# Patient Record
Sex: FEMALE | Race: WHITE | NOT HISPANIC OR LATINO
[De-identification: names, ages, dates, MRNs, and addresses within clinical notes are randomized per-mention and may not be internally consistent; named-entity substitution may affect disease eponyms.]

---

## 2017-01-04 ENCOUNTER — IMAGING SERVICES (OUTPATIENT)
Dept: OTHER | Age: 47
End: 2017-01-04

## 2017-01-04 ENCOUNTER — HOSPITAL (OUTPATIENT)
Dept: OTHER | Age: 47
End: 2017-01-04
Attending: SURGERY

## 2017-07-12 ENCOUNTER — IMAGING SERVICES (OUTPATIENT)
Dept: OTHER | Age: 47
End: 2017-07-12

## 2017-07-12 ENCOUNTER — HOSPITAL (OUTPATIENT)
Dept: OTHER | Age: 47
End: 2017-07-12
Attending: SURGERY

## 2017-07-17 ENCOUNTER — CHARTING TRANS (OUTPATIENT)
Dept: OTHER | Age: 47
End: 2017-07-17

## 2017-07-20 ENCOUNTER — CHARTING TRANS (OUTPATIENT)
Dept: OTHER | Age: 47
End: 2017-07-20

## 2017-08-04 ENCOUNTER — HOSPITAL (OUTPATIENT)
Dept: OTHER | Age: 47
End: 2017-08-04
Attending: SURGERY

## 2017-08-04 ENCOUNTER — IMAGING SERVICES (OUTPATIENT)
Dept: OTHER | Age: 47
End: 2017-08-04

## 2017-08-08 ENCOUNTER — CHARTING TRANS (OUTPATIENT)
Dept: OTHER | Age: 47
End: 2017-08-08

## 2017-08-10 ENCOUNTER — CHARTING TRANS (OUTPATIENT)
Dept: OTHER | Age: 47
End: 2017-08-10

## 2017-09-12 ENCOUNTER — HOSPITAL (OUTPATIENT)
Dept: OTHER | Age: 47
End: 2017-09-12
Attending: SURGERY

## 2017-09-12 ENCOUNTER — IMAGING SERVICES (OUTPATIENT)
Dept: OTHER | Age: 47
End: 2017-09-12

## 2017-09-14 ENCOUNTER — CHARTING TRANS (OUTPATIENT)
Dept: OTHER | Age: 47
End: 2017-09-14

## 2017-11-16 PROCEDURE — 81001 URINALYSIS AUTO W/SCOPE: CPT | Performed by: INTERNAL MEDICINE

## 2018-03-15 ENCOUNTER — IMAGING SERVICES (OUTPATIENT)
Dept: OTHER | Age: 48
End: 2018-03-15

## 2018-03-15 ENCOUNTER — HOSPITAL (OUTPATIENT)
Dept: OTHER | Age: 48
End: 2018-03-15
Attending: SURGERY

## 2018-03-21 ENCOUNTER — CHARTING TRANS (OUTPATIENT)
Dept: OTHER | Age: 48
End: 2018-03-21

## 2019-03-04 PROBLEM — Z80.3 FAMILY HISTORY OF BREAST CANCER: Status: ACTIVE | Noted: 2019-03-04

## 2019-03-04 PROBLEM — Z12.31 ENCOUNTER FOR SCREENING MAMMOGRAM FOR HIGH-RISK PATIENT: Status: ACTIVE | Noted: 2019-03-04

## 2019-03-04 PROBLEM — Z13.71 BRCA NEGATIVE: Status: ACTIVE | Noted: 2019-03-04

## 2019-03-04 PROBLEM — Z91.89 INCREASED RISK OF BREAST CANCER: Status: ACTIVE | Noted: 2019-03-04

## 2019-03-06 ENCOUNTER — OFFICE VISIT (OUTPATIENT)
Dept: SURGERY | Age: 49
End: 2019-03-06

## 2019-03-06 VITALS
BODY MASS INDEX: 22.22 KG/M2 | SYSTOLIC BLOOD PRESSURE: 96 MMHG | WEIGHT: 150 LBS | HEIGHT: 69 IN | DIASTOLIC BLOOD PRESSURE: 59 MMHG

## 2019-03-06 DIAGNOSIS — Z91.89 INCREASED RISK OF BREAST CANCER: Primary | ICD-10-CM

## 2019-03-06 DIAGNOSIS — Z13.71 BRCA NEGATIVE: ICD-10-CM

## 2019-03-06 DIAGNOSIS — Z80.3 FAMILY HISTORY OF BREAST CANCER: ICD-10-CM

## 2019-03-06 DIAGNOSIS — Z12.31 ENCOUNTER FOR SCREENING MAMMOGRAM FOR HIGH-RISK PATIENT: ICD-10-CM

## 2019-03-06 PROCEDURE — 99214 OFFICE O/P EST MOD 30 MIN: CPT | Performed by: SURGERY

## 2019-03-06 RX ORDER — ESCITALOPRAM OXALATE 10 MG/1
10 TABLET ORAL
COMMUNITY
Start: 2019-01-27 | End: 2020-07-22

## 2019-03-06 RX ORDER — LANOLIN ALCOHOL/MO/W.PET/CERES
1000 CREAM (GRAM) TOPICAL
COMMUNITY

## 2019-03-26 ENCOUNTER — HOSPITAL (OUTPATIENT)
Dept: OTHER | Age: 49
End: 2019-03-26
Attending: SURGERY

## 2019-04-04 ENCOUNTER — TRANSFERRED RECORDS (OUTPATIENT)
Dept: HEALTH INFORMATION MANAGEMENT | Facility: CLINIC | Age: 49
End: 2019-04-04

## 2019-04-04 PROCEDURE — 88305 TISSUE EXAM BY PATHOLOGIST: CPT | Performed by: INTERNAL MEDICINE

## 2019-08-07 ENCOUNTER — TELEPHONE (OUTPATIENT)
Dept: SURGERY | Age: 49
End: 2019-08-07

## 2019-09-11 ENCOUNTER — HOSPITAL (OUTPATIENT)
Dept: OTHER | Age: 49
End: 2019-09-11
Attending: SURGERY

## 2019-10-17 ENCOUNTER — TRANSFERRED RECORDS (OUTPATIENT)
Dept: HEALTH INFORMATION MANAGEMENT | Facility: CLINIC | Age: 49
End: 2019-10-17

## 2020-03-04 ENCOUNTER — APPOINTMENT (OUTPATIENT)
Dept: SURGERY | Age: 50
End: 2020-03-04

## 2020-03-05 ENCOUNTER — APPOINTMENT (OUTPATIENT)
Dept: SURGERY | Age: 50
End: 2020-03-05

## 2020-04-01 ENCOUNTER — HOSPITAL (OUTPATIENT)
Dept: OTHER | Age: 50
End: 2020-04-01

## 2020-05-01 ENCOUNTER — HOSPITAL (OUTPATIENT)
Dept: OTHER | Age: 50
End: 2020-05-01

## 2020-06-01 ENCOUNTER — HOSPITAL (OUTPATIENT)
Dept: OTHER | Age: 50
End: 2020-06-01

## 2020-07-01 ENCOUNTER — HOSPITAL (OUTPATIENT)
Dept: OTHER | Age: 50
End: 2020-07-01
Attending: SURGERY

## 2020-07-22 ENCOUNTER — OFFICE VISIT (OUTPATIENT)
Dept: SURGERY | Age: 50
End: 2020-07-22

## 2020-07-22 VITALS
WEIGHT: 150 LBS | SYSTOLIC BLOOD PRESSURE: 112 MMHG | HEIGHT: 70 IN | RESPIRATION RATE: 16 BRPM | DIASTOLIC BLOOD PRESSURE: 76 MMHG | HEART RATE: 58 BPM | BODY MASS INDEX: 21.47 KG/M2

## 2020-07-22 DIAGNOSIS — Z12.31 ENCOUNTER FOR SCREENING MAMMOGRAM FOR HIGH-RISK PATIENT: ICD-10-CM

## 2020-07-22 DIAGNOSIS — Z91.89 INCREASED RISK OF BREAST CANCER: Primary | ICD-10-CM

## 2020-07-22 PROCEDURE — 99213 OFFICE O/P EST LOW 20 MIN: CPT | Performed by: SURGERY

## 2020-08-12 ENCOUNTER — TRANSFERRED RECORDS (OUTPATIENT)
Dept: HEALTH INFORMATION MANAGEMENT | Facility: CLINIC | Age: 50
End: 2020-08-12

## 2020-08-21 ENCOUNTER — APPOINTMENT (OUTPATIENT)
Dept: MAMMOGRAPHY | Age: 50
End: 2020-08-21
Attending: SURGERY

## 2020-08-25 ENCOUNTER — HOSPITAL ENCOUNTER (OUTPATIENT)
Dept: MAMMOGRAPHY | Age: 50
Discharge: HOME | End: 2020-08-25
Attending: SURGERY

## 2020-08-25 DIAGNOSIS — Z12.31 ENCOUNTER FOR SCREENING MAMMOGRAM FOR HIGH-RISK PATIENT: ICD-10-CM

## 2020-08-25 PROCEDURE — 77063 BREAST TOMOSYNTHESIS BI: CPT

## 2020-10-12 ENCOUNTER — TELEPHONE (OUTPATIENT)
Dept: INFUSION THERAPY | Age: 50
End: 2020-10-12

## 2021-01-01 ENCOUNTER — EXTERNAL RECORD (OUTPATIENT)
Dept: HEALTH INFORMATION MANAGEMENT | Facility: OTHER | Age: 51
End: 2021-01-01

## 2021-02-16 ENCOUNTER — TRANSFERRED RECORDS (OUTPATIENT)
Dept: HEALTH INFORMATION MANAGEMENT | Facility: CLINIC | Age: 51
End: 2021-02-16

## 2021-04-15 ENCOUNTER — TELEPHONE (OUTPATIENT)
Dept: SURGERY | Age: 51
End: 2021-04-15

## 2021-05-26 ENCOUNTER — TELEPHONE (OUTPATIENT)
Dept: SURGERY | Age: 51
End: 2021-05-26

## 2021-07-06 ENCOUNTER — OFFICE VISIT (OUTPATIENT)
Dept: INTERNAL MEDICINE | Facility: CLINIC | Age: 51
End: 2021-07-06
Payer: COMMERCIAL

## 2021-07-06 VITALS
HEART RATE: 66 BPM | OXYGEN SATURATION: 100 % | HEIGHT: 70 IN | WEIGHT: 146.13 LBS | BODY MASS INDEX: 20.92 KG/M2 | DIASTOLIC BLOOD PRESSURE: 72 MMHG | SYSTOLIC BLOOD PRESSURE: 108 MMHG

## 2021-07-06 DIAGNOSIS — Z00.00 HEALTHCARE MAINTENANCE: ICD-10-CM

## 2021-07-06 DIAGNOSIS — H61.22 IMPACTED CERUMEN OF LEFT EAR: ICD-10-CM

## 2021-07-06 DIAGNOSIS — Z80.3 FAMILY HISTORY OF MALIGNANT NEOPLASM OF BREAST: ICD-10-CM

## 2021-07-06 DIAGNOSIS — K43.9 VENTRAL HERNIA WITHOUT OBSTRUCTION OR GANGRENE: ICD-10-CM

## 2021-07-06 DIAGNOSIS — D22.9 MULTIPLE BENIGN NEVI: ICD-10-CM

## 2021-07-06 DIAGNOSIS — Z00.00 HEALTHCARE MAINTENANCE: Primary | ICD-10-CM

## 2021-07-06 LAB
FSH SERPL-ACNC: 111.7 IU/L
HCV AB SERPL QL IA: NONREACTIVE

## 2021-07-06 PROCEDURE — 86803 HEPATITIS C AB TEST: CPT | Mod: 90 | Performed by: PATHOLOGY

## 2021-07-06 PROCEDURE — 36415 COLL VENOUS BLD VENIPUNCTURE: CPT | Performed by: PATHOLOGY

## 2021-07-06 PROCEDURE — 83001 ASSAY OF GONADOTROPIN (FSH): CPT | Mod: 90 | Performed by: PATHOLOGY

## 2021-07-06 PROCEDURE — 99386 PREV VISIT NEW AGE 40-64: CPT | Mod: GC

## 2021-07-06 PROCEDURE — 99000 SPECIMEN HANDLING OFFICE-LAB: CPT | Performed by: PATHOLOGY

## 2021-07-06 RX ORDER — LORAZEPAM 0.5 MG/1
0.5 TABLET ORAL EVERY 6 HOURS PRN
COMMUNITY
End: 2022-12-21

## 2021-07-06 RX ORDER — UBIDECARENONE 75 MG
100 CAPSULE ORAL DAILY
COMMUNITY

## 2021-07-06 RX ORDER — VITAMIN B COMPLEX
TABLET ORAL DAILY
COMMUNITY

## 2021-07-06 RX ORDER — FERROUS SULFATE 325(65) MG
325 TABLET ORAL
COMMUNITY

## 2021-07-06 RX ORDER — CHLORAL HYDRATE 500 MG
2 CAPSULE ORAL DAILY
COMMUNITY

## 2021-07-06 ASSESSMENT — PATIENT HEALTH QUESTIONNAIRE - PHQ9
5. POOR APPETITE OR OVEREATING: NOT AT ALL
SUM OF ALL RESPONSES TO PHQ QUESTIONS 1-9: 2

## 2021-07-06 ASSESSMENT — ANXIETY QUESTIONNAIRES
5. BEING SO RESTLESS THAT IT IS HARD TO SIT STILL: NOT AT ALL
2. NOT BEING ABLE TO STOP OR CONTROL WORRYING: NOT AT ALL
GAD7 TOTAL SCORE: 1
7. FEELING AFRAID AS IF SOMETHING AWFUL MIGHT HAPPEN: NOT AT ALL
6. BECOMING EASILY ANNOYED OR IRRITABLE: SEVERAL DAYS
IF YOU CHECKED OFF ANY PROBLEMS ON THIS QUESTIONNAIRE, HOW DIFFICULT HAVE THESE PROBLEMS MADE IT FOR YOU TO DO YOUR WORK, TAKE CARE OF THINGS AT HOME, OR GET ALONG WITH OTHER PEOPLE: NOT DIFFICULT AT ALL
1. FEELING NERVOUS, ANXIOUS, OR ON EDGE: NOT AT ALL
3. WORRYING TOO MUCH ABOUT DIFFERENT THINGS: NOT AT ALL

## 2021-07-06 ASSESSMENT — MIFFLIN-ST. JEOR: SCORE: 1345.13

## 2021-07-06 NOTE — NURSING NOTE
Chief Complaint   Patient presents with     [facility]     est care - hernia        Karly Rocha MA,  at 8:08 AM on 7/6/2021.

## 2021-07-06 NOTE — PROGRESS NOTES
PRIMARY CARE CENTER         HPI:      HPI:  Gemma Garvey is a 50 year old female who presents to the clinic to establish care with a new PCP. Pt states she recently moved to [place] from [place].        She would like to have her hernia evaluated today. Pt states she has 2 hernias which will bulge slightly when she bears down. She has had them for many years. They are not painful. Pt had surgery on the hernia n 10/2013.        Pt is also concerned about her L ear. She states she has a feeling of fullness, but no pain. The Right ear is unaffected.        Pt would also like to discuss breast cancer screening as she has a significant fhx of breast ca in her 2 sisters (out of 5 sisters).       She recently had Mirena removed. Pt stopped having a period, but did not notice she was going through menopause. Denies Hot flashes. She would like to know if she needs to use protection during sex.       PMH: -Anxiety (stopped anti-anxiety meds in aaprox 2019           -Hernia           -First period- 22;  Had 1st child at 29           -Due to pt's fhx of breast CA   Breast MRI in April and Mammogram August 2020 (both unremarkable)           -Last pap smear 9/9/2020           -Colonoscopy at age 48    Surgical hx- Hernia repair in 10/2013    Social hx- recently moved to [place] from [place] 2 weeks ago  Non-smoker; ETOH 2-3 drinks/per week (wine or beer)  -Has 2 children (Son is students at the  studying mnagement information systems); Daughter ar [facility] ([facility] studying mechanical [employer])  Mother in assisted living in [place] and 2 sisters live in [place]  Occupation:   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 All siblings BRCA negative        Medications:  Current Outpatient Medications   Medication     cyanocobalamin (VITAMIN B-12) 100 MCG tablet     ferrous sulfate (FEROSUL) 325 (65 Fe) MG  "tablet     fish oil-omega-3 fatty acids 1000 MG capsule     Vitamin D3 (CHOLECALCIFEROL) 25 mcg (1000 units) tablet     LORazepam (ATIVAN) 0.5 MG tablet     No current facility-administered medications for this visit.         Allergies:     Allergies   Allergen Reactions     Cats      No Known Drug Allergies          Medical History:  Past Medical History:   Diagnosis Date     Anemia, unspecified '93    Anemia- now resolved     Depressive disorder, not elsewhere classified '98    Depression (non-psychotic)-resolved with Paxil     MEDICAL HISTORY OF -     uterine prolapse           Problem, Medication and Allergy Lists were reviewed and are current.  Patient is a new patient to this clinic and so  I reviewed/updated the Past Medical History, the Family History and the Social History.          Review of Systems:     General: No fevers or chills  Skin: No rash or diaphoresis  Eyes: No eye redness or discharge  Ears/Nose/Throat: No rhinorrhea or nasal congestion  Respiratory: No cough or SOB  Cardiovascular: No chest pain or palpitations  Gastrointestinal: No nausea, vomiting, or diarrhea  Genitourinary: No urinary frequency, hematuria, or dysuria  Musculoskeletal: No arthralgias or myalgias  Neurologic: No numbness or weakness  Psychiatric: No depression or SI  Hematologic/Lymphatic/Immunologic: No leg swelling, no easy bruising/bleeding  Endocrine: No polyuria/polydypsia      ROS  I have personally reviewed and updated the complete ROS on the day of the visit.           Physical Exam:   /72 (BP Location: Right arm, Patient Position: Sitting, Cuff Size: Adult Regular)   Pulse 66   Ht 1.765 m (5' 9.5\")   Wt 66.3 kg (146 lb 2 oz)   SpO2 100%   BMI 21.27 kg/m    Body mass index is 21.27 kg/m .  Vitals were reviewed    Physical Exam:   General: Sitting upright, talking in complete sentences, non-distressed, pleasant and appears stated age  HEENT: Normocephalic, atraumatic, PERRL, EOMI, no conjunctival pallor, " anicteric, nares patent, oropharynx clear and moist, +Cerumen in L ear obstructing view of TM  CVS: S1/S2 WNL, RRR, no murmur, no LE edema, cap refill <2 seconds  Pulm: Non-labored breathing, vesicular breath sounds, no crackles or wheeze  Abdo: Non-distended, non-tender to palpation, no rebound or guarding, BS present; 2 ventral hernias (one in epigastric region and other slightly superior to umbilicus)  MSK: No obvious bony deformities, no clubbing  Skin: Warm and dry, no obvious rashes; Nevi on extremities and trunk  Neuro: Alert and oriented x3, non-focal   Psych: Normal mood and affect       Results:      Results from the last 24 hoursNo results found for this or any previous visit (from the past 24 hour(s)).  Assessment and Plan     Gemma was seen today for establish care.    Diagnoses and all orders for this visit:    Multiple benign nevi  -     ADULT DERMATOLOGY REFERRAL    Ventral hernia without obstruction or gangrene  -     GENERAL SURG ADULT REFERRAL    Family history of malignant neoplasm of breast  -     BREAST PROVIDER REFERRAL    Healthcare maintenance  -     Comprehensive metabolic panel; Future  -     CBC with platelets differential; Future  -     Follicle stimulating hormone; Future  -     Lipid panel reflex to direct LDL Fasting; Future  -     HCV Screen with Reflex; Future    Impacted cerumen of left ear    #Healthcare Maintenance  -Pt presented to establish care with PCP  -No significant medical problems  -Pt is fully vaccinated against Covid  -Pt had colonoscopy at age 48 and Pap smear on 9/9/21  -Pt will establish care with a GYN to have routine pelvic examinations  -She recently had Mirena removed. I recommended pt use condoms for now. Will check FSH.   -Check CBC, CMP, Lipid panel, and HCV  -F/u for routine exam in 1 year    #Multiple Benign Nevi  -Pt is fair skinned with red hair  -She does not have routine skin checks  -Will have pt f/u with Derm for annual skin checks    #Ventral hernia  w/o obstruction or gangrene  -2 ventral hernias; pt had surgery for hernias in 2013, but they have recurred  -Pt denies pain; Discussed symptoms for pt to monitor which would indicate obstruction or incarceration.   -Will refer to gen surg for further eval and management    #Fhx of breast ca  -Pt is BRCA negative  -Pt's 2 sisters had breast ca, they are s/p lumpectomy and tamoxifen therapy  -Pt was followed by breast specialist in [place]; had q 6 month mammogram alternating with MRI  -Tricia score 2.8%  -Will refer pt to breast center for preventive care and high risk breast screening    #Cerumen in L ear  -Recommend Debrox use to soften cerumen    Options for treatment and follow-up care were reviewed with the patient. Gemma Garvey engaged in the decision making process and verbalized understanding of the options discussed and agreed with the final plan.    Yvrose Garcia MD  Jul 6, 2021    Pt was seen and plan of care discussed with Dr. Roca.     Yvrose Garcia MD  Internal Medicine    PGY-1

## 2021-07-06 NOTE — PATIENT INSTRUCTIONS
Gemma,    Thank you for establishing care with me today. You will be referred to specialists in Dermatology, General Surgery, and the Breast Center. You may also establish care with an OB/GYN. I will follow up with you for your routing annual exam in one year. Please do not hesitate to contact me sooner if you have any questions or concerns.   Best,    Dr. Garcia

## 2021-07-07 ASSESSMENT — ANXIETY QUESTIONNAIRES: GAD7 TOTAL SCORE: 1

## 2021-07-13 ENCOUNTER — TELEPHONE (OUTPATIENT)
Dept: INTERNAL MEDICINE | Facility: CLINIC | Age: 51
End: 2021-07-13

## 2021-07-13 NOTE — TELEPHONE ENCOUNTER
I called Gemma Vincent this morning (7/13/21) at 8:28AM. I shared with her that her HCV test was nonreactive and also that her FSH level was 111 and within the postmenopausal range. I informed her that she no longer needed to use protection during intercourse. Pt should f/u if she has any other questions or concerns and I am happy to see her at any time. If no concerns arise, I will see her June 2022 for her next annual visit.     Yvrose Garcia MD  Internal Medicine  PGY1

## 2021-07-15 ENCOUNTER — PATIENT OUTREACH (OUTPATIENT)
Dept: SURGERY | Facility: CLINIC | Age: 51
End: 2021-07-15

## 2021-07-15 NOTE — PROGRESS NOTES
Called patient to schedule an appointment for a hernia consult based on referral sent to General Surgery on 7/6. Based on clinic visit, patient has two ventral hernias, no imaging. Left voicemail on patient's cell phone to return call to General Surgery Clinic if she is wanting to schedule an appointment.

## 2021-07-24 ENCOUNTER — HEALTH MAINTENANCE LETTER (OUTPATIENT)
Age: 51
End: 2021-07-24

## 2021-07-29 ENCOUNTER — APPOINTMENT (OUTPATIENT)
Dept: SURGERY | Age: 51
End: 2021-07-29

## 2021-08-23 ENCOUNTER — TELEPHONE (OUTPATIENT)
Facility: CLINIC | Age: 51
End: 2021-08-23

## 2021-08-23 NOTE — TELEPHONE ENCOUNTER
[facility] Call Center    Phone Message    May a detailed message be left on voicemail: yes     Reason for Call: Appointment Intake    Referring Provider Name: Yvrose Garcia  Diagnosis and/or Symptoms: Ventral Hernia    Action Taken: Message routed to:  [facility] Gen Surg    Travel Screening: Not Applicable

## 2021-08-26 ENCOUNTER — LAB (OUTPATIENT)
Dept: LAB | Facility: CLINIC | Age: 51
End: 2021-08-26
Payer: COMMERCIAL

## 2021-08-26 DIAGNOSIS — Z00.00 HEALTHCARE MAINTENANCE: ICD-10-CM

## 2021-08-26 LAB
ALBUMIN SERPL-MCNC: 4.2 G/DL (ref 3.4–5)
ALP SERPL-CCNC: 68 U/L (ref 40–150)
ALT SERPL W P-5'-P-CCNC: 29 U/L (ref 0–50)
ANION GAP SERPL CALCULATED.3IONS-SCNC: 1 MMOL/L (ref 3–14)
AST SERPL W P-5'-P-CCNC: 24 U/L (ref 0–45)
BILIRUB SERPL-MCNC: 0.4 MG/DL (ref 0.2–1.3)
BUN SERPL-MCNC: 13 MG/DL (ref 7–30)
CALCIUM SERPL-MCNC: 9.3 MG/DL (ref 8.5–10.1)
CHLORIDE BLD-SCNC: 107 MMOL/L (ref 94–109)
CHOLEST SERPL-MCNC: 199 MG/DL
CO2 SERPL-SCNC: 30 MMOL/L (ref 20–32)
CREAT SERPL-MCNC: 0.81 MG/DL (ref 0.52–1.04)
ERYTHROCYTE [DISTWIDTH] IN BLOOD BY AUTOMATED COUNT: 13.1 % (ref 10–15)
FASTING STATUS PATIENT QL REPORTED: YES
GFR SERPL CREATININE-BSD FRML MDRD: 84 ML/MIN/1.73M2
GLUCOSE BLD-MCNC: 93 MG/DL (ref 70–99)
HCT VFR BLD AUTO: 39.5 % (ref 35–47)
HDLC SERPL-MCNC: 102 MG/DL
HGB BLD-MCNC: 12.6 G/DL (ref 11.7–15.7)
LDLC SERPL CALC-MCNC: 87 MG/DL
MCH RBC QN AUTO: 31.2 PG (ref 26.5–33)
MCHC RBC AUTO-ENTMCNC: 31.9 G/DL (ref 31.5–36.5)
MCV RBC AUTO: 98 FL (ref 78–100)
NONHDLC SERPL-MCNC: 97 MG/DL
PLATELET # BLD AUTO: 167 10E3/UL (ref 150–450)
POTASSIUM BLD-SCNC: 4.8 MMOL/L (ref 3.4–5.3)
PROT SERPL-MCNC: 6.8 G/DL (ref 6.8–8.8)
RBC # BLD AUTO: 4.04 10E6/UL (ref 3.8–5.2)
SODIUM SERPL-SCNC: 138 MMOL/L (ref 133–144)
TRIGL SERPL-MCNC: 51 MG/DL
WBC # BLD AUTO: 2.6 10E3/UL (ref 4–11)

## 2021-08-26 PROCEDURE — 80053 COMPREHEN METABOLIC PANEL: CPT | Performed by: PATHOLOGY

## 2021-08-26 PROCEDURE — 85027 COMPLETE CBC AUTOMATED: CPT | Performed by: PATHOLOGY

## 2021-08-26 PROCEDURE — 36415 COLL VENOUS BLD VENIPUNCTURE: CPT | Performed by: PATHOLOGY

## 2021-08-26 PROCEDURE — 80061 LIPID PANEL: CPT | Performed by: PATHOLOGY

## 2021-09-13 NOTE — TELEPHONE ENCOUNTER
Called patient and got her scheduled with Dr. Almendarez, a partner of Dr. Doan, on 9/24/21 at 12:00pm. Patient aware and agreeable to this appointment.

## 2021-09-13 NOTE — TELEPHONE ENCOUNTER
[facility] Call Center    Phone Message    May a detailed message be left on voicemail: yes     Reason for Call: Other: Gemma calling back. She states no one has called her to schedule apt with Dr. Doan. See message below. Please call her back as soon as possible to schedule.      Action Taken: Message routed to:  [facility]): uc surg    Travel Screening: Not Applicable

## 2021-09-14 NOTE — TELEPHONE ENCOUNTER
REFERRAL INFORMATION:    Referring Provider:  Dr. Yvrose Garcia     Referring Clinic:  [facility] Internal Medicine     Reason for Visit/Diagnosis: Ventral Hernia        FUTURE VISIT INFORMATION:    Appointment Date: 9/24/2021    Appointment Time: 12 PM      NOTES RECORD STATUS  DETAILS   OFFICE NOTE from Referring Provider Internal 7/6/2021 Office visit with Dr. Garcia     OFFICE NOTE from Other Specialists N/A    HOSPITAL DISCHARGE SUMMARY/ ED VISITS  N/A    OPERATIVE REPORT N/A    ENDOSCOPY (EGD)  N/A    PERTINENT LABS Internal    PATHOLOGY REPORTS (RELATED) N/A    IMAGING (CT, MRI, US, XR)  N/A

## 2021-09-16 ENCOUNTER — TELEPHONE (OUTPATIENT)
Dept: INTERNAL MEDICINE | Facility: CLINIC | Age: 51
End: 2021-09-16

## 2021-09-16 NOTE — TELEPHONE ENCOUNTER
[facility] Call Center    Phone Message    May a detailed message be left on voicemail: yes     Reason for Call: Other: Patient calling to request her NICANOR that was scanned on 8/24/21 be faxed to [facility] to request her records. Patient states she spoke with medical records and they informed her that it should of been faxed by her PCP for her request. Please call to discuss thank you.      Action Taken: Message routed to:  [facility] pcc    Travel Screening: Not Applicable

## 2021-09-17 NOTE — CONFIDENTIAL NOTE
Left a message with [employer] to return my call.  I also re faxed the NICANOR again to them.    Sindi Mayer on 9/17/2021 at 11:23 AM

## 2021-09-18 ENCOUNTER — HEALTH MAINTENANCE LETTER (OUTPATIENT)
Age: 51
End: 2021-09-18

## 2021-09-21 NOTE — PROGRESS NOTES
[facility] Dermatology Note  Encounter Date: Sep 22, 2021  Office Visit     Dermatology Problem List:  #. FBSE 9/22/2021  ____________________________________________    Assessment & Plan:    # Multiple benign nevi.   - No concerning lesions today  - Counseled on ABCDEs of melanoma and sun protection - recommend SPF 30 or higher with frequent application   - Return sooner if noticing changing or symptomatic lesions    #. Benign lesions: Solar lentigenes, cherry angiomas. Benign nature discussed. No treatment is necessary at this time unless the lesion changes or becomes symptomatic.       Procedures Performed:   None    Follow-up: 1-2 year(s) in-person, or earlier for new or changing lesions    Staff and Scribe:     Provider Disclosure:   The documentation recorded by the scribe accurately reflects the services I personally performed and the decisions made by me.    Adela Schmidt MD    Department of Dermatology  [facility]: Phone: [phone], Fax: [phone]  9/30/2021       Scribe Disclosure:  I, Shania Cotto, am serving as a scribe to document services personally performed by Adela Schmidt MD based on data collection and the provider's statements to me.   ____________________________________________    CC: Skin Check (alberto is coming in today for a skin check, states that she has no spots of concern)      HPI:  Ms. Alberto Garvey is a(n) 50 year old female who presents today as a new patient for skin check.    Patient was referred by Dr. Garcia for a skin check.    The patient otherwise denies any new or concerning lesions. No bleeding, painful, pruritic, or changing lesions. They report no personal history of skin cancer. There is no family history of skin cancer. No history of indoor tanning. Although, she does endorse a significant history of blistering sunburn. She does wear sunscreen  on her face, but not to other spots on her body. She does have fair skin, red hair, and burns easily. Health otherwise stable. No other skin concerns.     SH: Patient recently moved to [place] in June.     Patient is otherwise feeling well, without additional skin concerns.    Labs Reviewed:  N/A    Physical Exam:  Vitals: There were no vitals taken for this visit.  SKIN: Full body skin exam excluding the genitals was performed including face, scalp, neck, ears, chest, back, bilateral arms, hands, bilateral legs, feet, and buttocks.   - There are waxy stuck on tan to brown papules on the right shoulder.   - Multiple regular brown pigmented macules and papules are identified on the trunk and extremities.    - There are dome shaped bright red papules on the trunk and extremities.   - Scattered brown macules on sun exposed areas.  - No other lesions of concern on areas examined.     Medications:  Current Outpatient Medications   Medication     cyanocobalamin (VITAMIN B-12) 100 MCG tablet     ferrous sulfate (FEROSUL) 325 (65 Fe) MG tablet     fish oil-omega-3 fatty acids 1000 MG capsule     LORazepam (ATIVAN) 0.5 MG tablet     Vitamin D3 (CHOLECALCIFEROL) 25 mcg (1000 units) tablet     No current facility-administered medications for this visit.        Past Medical History:   Patient Active Problem List   Diagnosis     Uterovaginal prolapse, incomplete     Past Medical History:   Diagnosis Date     Anemia, unspecified '93    Anemia- now resolved     Depressive disorder, not elsewhere classified '98    Depression (non-psychotic)-resolved with Paxil     MEDICAL HISTORY OF -     uterine prolapse        CC Referred Self, MD  No address on file on close of this encounter.

## 2021-09-22 ENCOUNTER — OFFICE VISIT (OUTPATIENT)
Dept: DERMATOLOGY | Facility: CLINIC | Age: 51
End: 2021-09-22
Payer: COMMERCIAL

## 2021-09-22 DIAGNOSIS — L81.4 LENTIGINES: ICD-10-CM

## 2021-09-22 DIAGNOSIS — D18.01 CHERRY ANGIOMA: ICD-10-CM

## 2021-09-22 DIAGNOSIS — D22.9 MULTIPLE BENIGN NEVI: Primary | ICD-10-CM

## 2021-09-22 PROCEDURE — 99203 OFFICE O/P NEW LOW 30 MIN: CPT | Performed by: DERMATOLOGY

## 2021-09-22 ASSESSMENT — PAIN SCALES - GENERAL: PAINLEVEL: NO PAIN (0)

## 2021-09-22 NOTE — LETTER
9/22/2021       RE: Alberto Garvey  [address]  [facility] [address]     Dear Colleague,    Thank you for referring your patient, Alberto Garvey, to the [facility] DERMATOLOGY [facility] [place] at [facility]. Please see a copy of my visit note below.    [facility] Dermatology Note  Encounter Date: Sep 22, 2021  Office Visit     Dermatology Problem List:  #. FBSE 9/22/2021  ____________________________________________    Assessment & Plan:    # Multiple benign nevi.   - No concerning lesions today  - Counseled on ABCDEs of melanoma and sun protection - recommend SPF 30 or higher with frequent application   - Return sooner if noticing changing or symptomatic lesions    #. Benign lesions: Solar lentigenes, cherry angiomas. Benign nature discussed. No treatment is necessary at this time unless the lesion changes or becomes symptomatic.       Procedures Performed:   None    Follow-up: 1-2 year(s) in-person, or earlier for new or changing lesions    Staff and Scribe:     Provider Disclosure:   The documentation recorded by the scribe accurately reflects the services I personally performed and the decisions made by me.    Adela Schmidt MD    Department of Dermatology  [facility]: Phone: [phone], Fax: [phone]  9/30/2021       Scribe Disclosure:  I, Shania Cotto, am serving as a scribe to document services personally performed by Adela Schmidt MD based on data collection and the provider's statements to me.   ____________________________________________    CC: Skin Check (alberto is coming in today for a skin check, states that she has no spots of concern)      HPI:  Ms. Alberto Garvey is a(n) 50 year old female who presents today as a new patient for skin check.    Patient was referred by Dr. Garcia for a skin  check.    The patient otherwise denies any new or concerning lesions. No bleeding, painful, pruritic, or changing lesions. They report no personal history of skin cancer. There is no family history of skin cancer. No history of indoor tanning. Although, she does endorse a significant history of blistering sunburn. She does wear sunscreen on her face, but not to other spots on her body. She does have fair skin, red hair, and burns easily. Health otherwise stable. No other skin concerns.     SH: Patient recently moved to [place] in June.     Patient is otherwise feeling well, without additional skin concerns.    Labs Reviewed:  N/A    Physical Exam:  Vitals: There were no vitals taken for this visit.  SKIN: Full body skin exam excluding the genitals was performed including face, scalp, neck, ears, chest, back, bilateral arms, hands, bilateral legs, feet, and buttocks.   - There are waxy stuck on tan to brown papules on the right shoulder.   - Multiple regular brown pigmented macules and papules are identified on the trunk and extremities.    - There are dome shaped bright red papules on the trunk and extremities.   - Scattered brown macules on sun exposed areas.  - No other lesions of concern on areas examined.     Medications:  Current Outpatient Medications   Medication     cyanocobalamin (VITAMIN B-12) 100 MCG tablet     ferrous sulfate (FEROSUL) 325 (65 Fe) MG tablet     fish oil-omega-3 fatty acids 1000 MG capsule     LORazepam (ATIVAN) 0.5 MG tablet     Vitamin D3 (CHOLECALCIFEROL) 25 mcg (1000 units) tablet     No current facility-administered medications for this visit.        Past Medical History:   Patient Active Problem List   Diagnosis     Uterovaginal prolapse, incomplete     Past Medical History:   Diagnosis Date     Anemia, unspecified '93    Anemia- now resolved     Depressive disorder, not elsewhere classified '98    Depression (non-psychotic)-resolved with Paxil     MEDICAL HISTORY OF -     uterine  prolapse        CC Referred Self, MD  No address on file on close of this encounter.

## 2021-09-22 NOTE — NURSING NOTE
Dermatology Rooming Note    Alberto Garvey's goals for this visit include:   Chief Complaint   Patient presents with     Skin Check     alberto is coming in today for a skin check, states that she has no spots of concern     Annabella George CMA on 9/22/2021 at 8:05 AM

## 2021-09-22 NOTE — PATIENT INSTRUCTIONS
A+!  Keep up the good work :)      Patient Education     Checking for Skin Cancer  You can find cancer early by checking your skin each month. There are 3 kinds of skin cancer. They are melanoma, basal cell carcinoma, and squamous cell carcinoma. Doing monthly skin checks is the best way to find new marks or skin changes. Follow the instructions below for checking your skin.   The ABCDEs of checking moles for melanoma   Check your moles or growths for signs of melanoma using ABCDE:     Asymmetry: the sides of the mole or growth don t match    Border: the edges are ragged, notched, or blurred    Color: the color within the mole or growth varies    Diameter: the mole or growth is larger than 6 mm (size of a pencil eraser)    Evolving: the size, shape, or color of the mole or growth is changing (evolving is not shown in the images below)    Checking for other types of skin cancer  Basal cell carcinoma or squamous cell carcinoma have symptoms such as:       A spot or mole that looks different from all other marks on your skin    Changes in how an area feels, such as itching, tenderness, or pain    Changes in the skin's surface, such as oozing, bleeding, or scaliness    A sore that does not heal    New swelling or redness beyond the border of a mole    Who s at risk?  Anyone can get skin cancer. But you are at greater risk if you have:     Fair skin, light-colored hair, or light-colored eyes    Many moles or abnormal moles on your skin    A history of sunburns from sunlight or tanning beds    A family history of skin cancer    A history of exposure to radiation or chemicals    A weakened immune system  If you have had skin cancer in the past, you are at risk for recurring skin cancer.   How to check your skin  Do your monthly skin checkups in front of a full-length mirror. Check all parts of your body, including your:     Head (ears, face, neck, and scalp)    Torso (front, back, and sides)    Arms (tops, undersides,  upper, and lower armpits)    Hands (palms, backs, and fingers, including under the nails)    Buttocks and genitals    Legs (front, back, and sides)    Feet (tops, soles, toes, including under the nails, and between toes)  If you have a lot of moles, take digital photos of them each month. Make sure to take photos both up close and from a distance. These can help you see if any moles change over time.   Most skin changes are not cancer. But if you see any changes in your skin, call your doctor right away. Only he or she can diagnose a problem. If you have skin cancer, seeing your doctor can be the first step toward getting the treatment that could save your life.   Pan[employer] last reviewed this educational content on 4/1/2019 2000-2020 [employer]. [address] [place] [address]. All rights reserved. This information is not intended as a substitute for professional medical care. Always follow your healthcare professional's instructions.       When should I call my doctor?    If you are worsening or not improving, please, contact us or seek urgent care as noted below.     Who should I call with questions (adults)?    [facility] (adult and pediatric): [phone]    [facility] (adult): [phone]    For urgent needs outside of business hours call the [facility] at [phone] and ask for the dermatology resident on call to be paged    If this is a medical emergency and you are unable to reach an ER, Call [phone]    Who should I call with questions (pediatric)?  [facility]- Pediatric Dermatology  Dr. Enriqueta Leonard, Dr. Laron Weems, Dr. Ariana Thompson, Allison Velazquez, PA, Dr. Hien Etienne, Dr. Shannan Pierre & Dr. Reese Piper  Non-urgent nurse triage line; [phone]- Chyna and Torrie HANNA Care Coordinatormonique Sandoval (/Complex ) [phone]    If you need a  prescription refill, please contact your pharmacy. Refills are approved or denied by our Physicians during normal business hours, Monday through Fridays  Per office policy, refills will not be granted if you have not been seen within the past year (or sooner depending on your child's condition)    Scheduling Information:  Pediatric Appointment Scheduling and Call Center [phone]  Radiology Scheduling- [phone]  Sedation Unit Scheduling- [phone]  [place] Scheduling- [facility] [phone]; Pediatric Dermatology [phone]  Main  Services: [phone]  Japanese: [phone]  Australian: [phone]  Hmong/Irish/Citizen of [place]: [phone]  Preadmission Nursing Department Fax Number: [phone] (Fax all pre-operative paperwork to this number)    For urgent matters arising during evenings, weekends, or holidays that cannot wait for normal business hours please call [phone] and ask for the dermatology resident on call to be paged.

## 2021-09-24 ENCOUNTER — PRE VISIT (OUTPATIENT)
Dept: SURGERY | Facility: CLINIC | Age: 51
End: 2021-09-24

## 2021-09-24 ENCOUNTER — PATIENT OUTREACH (OUTPATIENT)
Dept: SURGERY | Facility: CLINIC | Age: 51
End: 2021-09-24

## 2021-09-24 NOTE — PROGRESS NOTES
Patient Telephone Reminder Call    Date of call:  09/24/21  Phone numbers:  Cell number on file:    Telephone Information:   Mobile [phone]       Reached patient/confirmed appointment:  Yes  Appointment with:   Dr. Antonio Alonso  Reason for visit:  Hernia

## 2021-09-27 ENCOUNTER — OFFICE VISIT (OUTPATIENT)
Dept: SURGERY | Facility: CLINIC | Age: 51
End: 2021-09-27
Payer: COMMERCIAL

## 2021-09-27 VITALS
OXYGEN SATURATION: 97 % | SYSTOLIC BLOOD PRESSURE: 110 MMHG | DIASTOLIC BLOOD PRESSURE: 73 MMHG | HEIGHT: 69 IN | WEIGHT: 147.9 LBS | HEART RATE: 69 BPM | BODY MASS INDEX: 21.91 KG/M2

## 2021-09-27 DIAGNOSIS — K43.9 VENTRAL HERNIA WITHOUT OBSTRUCTION OR GANGRENE: Primary | ICD-10-CM

## 2021-09-27 PROCEDURE — 99203 OFFICE O/P NEW LOW 30 MIN: CPT | Performed by: SURGERY

## 2021-09-27 RX ORDER — CEFAZOLIN SODIUM 2 G/50ML
2 SOLUTION INTRAVENOUS SEE ADMIN INSTRUCTIONS
Status: CANCELLED | OUTPATIENT
Start: 2021-09-27

## 2021-09-27 RX ORDER — CEFAZOLIN SODIUM 2 G/50ML
2 SOLUTION INTRAVENOUS
Status: CANCELLED | OUTPATIENT
Start: 2021-09-27

## 2021-09-27 ASSESSMENT — PAIN SCALES - GENERAL: PAINLEVEL: NO PAIN (0)

## 2021-09-27 ASSESSMENT — MIFFLIN-ST. JEOR: SCORE: 1345.25

## 2021-09-27 NOTE — PROGRESS NOTES
Gemma Garvey is a 50 year old female with a primary ventral hernia and a recurrence of a primary ventral hernia that was repaired 8 years ago and recurred since then.  Onset did occur with lifting.  Obstructive symptoms:  no  Urinary difficulties:  no  Chronic cough: no  Constipation:  no  Current level of activity:  Medium, works as , at home with family.    Past medical history, medications, allergies, family history, and social history were reviewed with the patient.    Past Medical History:   Diagnosis Date     Anemia, unspecified '93    Anemia- now resolved     Depressive disorder, not elsewhere classified '98    Depression (non-psychotic)-resolved with Paxil     MEDICAL HISTORY OF -     uterine prolapse       Past Surgical History:   Procedure Laterality Date     ZZC NONSPECIFIC PROCEDURE  11/00    Vaginal delivery X2       ROS: 10 point review of systems negative except noted in HPI  PHYSICAL EXAM  General appearance- healthy, alert, and in no distress.  Skin- Skin color and turgor normal.  No obvious rashes.  Neck- Neck is supple without obvious adenopathy.  Lungs- Respiratory effort unlabored.  Gait- Normal.  Abdomen - soft non distended, non tender with 2 midline primary ventral hernias ( recurred has scar over site) at epigastric and supra umbilical site.    Impression:  Hernia, ventral and primary and recurrent.  Recommendation:  open mesh repair surgery    A full discussion regarding the alternatives, risks, goals, and potential complications for this surgery was completed today.  The patient understood that the potential problems included but are not limited to:  Infection, bleeding, hematoma, seroma, and recurrence.    The patient verbally expressed understanding, was given the opportunity for questions, and gives full informed consent for the procedure.      Today the patient was instructed on the need for a preoperative H&P, NPO status prior to surgery, and the need to stop  anticoagulants prior to surgery.  Additional educational material, soap, and instructions will be mailed out to the patients home.    The total time spent with this patient was 30 minutes.  The total time was spent on the date of encounter doing chart review, history and physical, dressing changes, documentation, patient education, and any further activity as noted above.

## 2021-09-27 NOTE — NURSING NOTE
Pre and Post op Patient Education/Teaching Flowsheet  Relevant Diagnosis:  Ventral hernia  Teaching Topic:  Pre and post op teaching  Person(s) Involved in teaching:  Patient     Motivation Level:  Asks Questions:  Yes  Eager to Learn:  Yes  Cooperative:  Yes  Receptive (willing/able to accept information):  Yes  Any cultural factors/Catholic beliefs that may influence understanding or compliance?  No    Patient/caregiver/family demonstrates understanding of the following:  Reason for the appointment, diagnosis, and treatment plan:  Yes  Patient demonstrates understanding of the following:  Pre-op bowel prep:  N/A  Post-op pain management recommendations (medications, ice compress, binder/athletic supporter (if applicable), etc.:  Yes  Inguinal hernia patients:  Post-op urinary retention- discussed signs/symptoms and visit to ER for Soriano catheter placement and to stay in place for at least 48 hours:  NA  Restrictions:  Yes  Medications to take the day of surgery:  Per PCP  Blood thinner medications discussed and when to stop (if applicable):  Yes  Wound care:  Yes  Diabetes medication management (if applicable):  Per PCP  Which situations necessitate calling provider and whom to contact:  Discussed how to contact the hospital, nurse, and clinic scheduling staff if necessary      Date and time of surgery:  TBD  Location of surgery: [facility] [place]- 5th Floor  History and Physical and any other testing necessary prior to surgery:  Yes, can be done by PCP  Required time line for completion of History and Physical and any pre-op testing:  Yes  Discuss need for someone to drive patient home and stay with them for 24 hours:  Yes  Pre-op showering/scrub information with Surgical Scrub:  Yes  NPO Guidelines:  NPO per Anesthesia Guidelines  COVID-19 Testing:  Yes    Infection Prevention: Patient demonstrates understanding of the following:  Patient instructed on hand hygiene:  Yes  Surgical  procedure site care will be taught and will be reviewed at the time of discharge  Signs and symptoms of infection taught:  Yes  Wound care reviewed and will be taught at the time of discharge  Central venous catheter care will be taught at the time of discharge (if applicable)    Post-op follow-up:  Instructional materials used/given/mailed:  [facility] Booklet, post op teaching sheet, Map, Soap, and arrival/location information    Surgical instructions mailed to patient

## 2021-09-27 NOTE — NURSING NOTE
"Chief Complaint   Patient presents with     New Patient     Discuss hernia surgery       Vitals:    09/27/21 1327   BP: 110/73   BP Location: Left arm   Patient Position: Sitting   Cuff Size: Adult Regular   Pulse: 69   SpO2: 97%   Weight: 67.1 kg (147 lb 14.4 oz)   Height: 1.753 m (5' 9\")       Body mass index is 21.84 kg/m .                          Bella Martell, EMT  "

## 2021-09-27 NOTE — LETTER
9/27/2021       RE: Gemma Garvey  [address]  [facility] [address]     Dear Colleague,    Thank you for referring your patient, Gemma Garvey, to the [facility] [place] at [facility]. Please see a copy of my visit note below.    Gemma Garvey is a 50 year old female with a primary ventral hernia and a recurrence of a primary ventral hernia that was repaired 8 years ago and recurred since then.  Onset did occur with lifting.  Obstructive symptoms:  no  Urinary difficulties:  no  Chronic cough: no  Constipation:  no  Current level of activity:  Medium, works as , at home with family.    Past medical history, medications, allergies, family history, and social history were reviewed with the patient.    Past Medical History:   Diagnosis Date     Anemia, unspecified '93    Anemia- now resolved     Depressive disorder, not elsewhere classified '98    Depression (non-psychotic)-resolved with Paxil     MEDICAL HISTORY OF -     uterine prolapse       Past Surgical History:   Procedure Laterality Date     ZZ NONSPECIFIC PROCEDURE  11/00    Vaginal delivery X2       ROS: 10 point review of systems negative except noted in HPI  PHYSICAL EXAM  General appearance- healthy, alert, and in no distress.  Skin- Skin color and turgor normal.  No obvious rashes.  Neck- Neck is supple without obvious adenopathy.  Lungs- Respiratory effort unlabored.  Gait- Normal.  Abdomen - soft non distended, non tender with 2 midline primary ventral hernias ( recurred has scar over site) at epigastric and supra umbilical site.    Impression:  Hernia, ventral and primary and recurrent.  Recommendation:  open mesh repair surgery    A full discussion regarding the alternatives, risks, goals, and potential complications for this surgery was completed today.  The patient understood that the potential problems included but are  not limited to:  Infection, bleeding, hematoma, seroma, and recurrence.    The patient verbally expressed understanding, was given the opportunity for questions, and gives full informed consent for the procedure.      Today the patient was instructed on the need for a preoperative H&P, NPO status prior to surgery, and the need to stop anticoagulants prior to surgery.  Additional educational material, soap, and instructions will be mailed out to the patients home.    The total time spent with this patient was 30 minutes.  The total time was spent on the date of encounter doing chart review, history and physical, dressing changes, documentation, patient education, and any further activity as noted above.              Again, thank you for allowing me to participate in the care of your patient.      Sincerely,    Antonio Alonso MD

## 2021-09-27 NOTE — PATIENT INSTRUCTIONS
You met with Dr. Antonio Alonso.      Today's visit instructions:    Reminder:  Surgery Requirements  1. Your surgery will be at [facility] [place]- 5th Floor  2. You will need to arrive 1.5 hours early  3. You will need someone to drive you home (over 18 years old) and stay with you for 24 hours after the procedure.  4. You will need a preop physical with your regular doctor within 30 days of surgery- closer is always better.  5. Stop any blood thinners, vitamins, minerals, or herbal supplements 5 days before surgery.  If you are taking a prescribed blood thinner please let us know for specific instructions.  6. Fasting- a nurse from [facility] will call you 1-2 days before surgery to confirm your procedure and tell you when to stop eating and drinking.   7. Wash with the soap (Antibacterial, Dial Complete Foam, Hibiclense, or soap given/mailed from the clinic) the night before surgery and morning of surgery. See instructions in the Surgery Packet.  8. You will need to undergo a COVID-19 test 2-4 days prior to your scheduled procedure.  Our surgery scheduler will help arrange testing.  9. If you would like a procedure estimate please call [facility] Financial Counselor at [phone] or [phone].       If you have questions please contact Magy RN or Sabina RN during regular clinic hours, Monday through Friday 7:30 AM - 4:00 PM, or you can contact us via [facility] at anytime.       If you have urgent needs after-hours, weekends, or holidays please call the hospital at [phone] and ask to speak with our on-call General Surgery Team.    Appointment [phone], option #1   Nurse Advice (Magy or Sabina): [phone]   Surgery Scheduler (Kanu): [phone]  Fax: [phone]      After Your Open Ventral Hernia Repair          Incision care     You may take a shower the day after surgery. Carefully wash your incision with soap and water. Do not submerge yourself in water  (bath, whirlpool, hot tub, pool, lake) for 14 days after surgery. Always wash your hands before touching your incisions or removing bandages.    Remove the bandage the day after surgery, but leave the medical tape (Steri-Strips) or glue in place. These will loosen and fall off on their own 1-2 weeks after surgery.       It is not unusual to form a collection of fluid or blood under your incision that may feel firm or squishy- it can take several weeks to months for your body to reabsorb it.  At times, it may even drain.  If that should happen keep the area clean with soap, water,  and cover with a clean gauze dressing. You can change this daily or as needed.      Other medicines     Wait to start aspirin or blood thinners until the day after surgery. You can continue your regular medicines at your normal time the day after surgery.     Your pain medicine may cause constipation (hard, dry stools). To help with this, take the stool softener your doctor gave you or an over-the-counter stool softener or laxative. You can stop taking this when you are no longer taking pain medicine and your bowel movements are back to normal.      For pain or discomfort     Take the narcotic pain medicine your doctor gave you as needed and as instructed on the bottle. If you prefer to use over-the-counter medication, use acetaminophen (Tylenol) or ibuprofen (Advil, Motrin) as instructed on the box. Do not take Tylenol if it is in your narcotic pain medication.     Use an ice pack on your incision (surgical cut) for 20 minutes at a time as needed for the first 24 hours. Be sure to protect your skin by putting a cloth between the ice pack and your skin.     After 24 hours you can switch to heat for 20 minutes as needed. Be sure to protect your skin by putting a cloth between the heat pack and your skin.         Activities     No driving until you feel it s safe to do so. Don t drive while taking narcotic pain medicine.     Don t lift  anything heavier than 20 pounds for 3 to 4 weeks after surgery.      Special equipment     If we gave you an abdominal binder, wear it for the first 30 days after surgery. You don t have to wear it when you re sleeping. You can wear it longer than 30 days if you wish.      Diet     You can eat your regular meals after surgery.      When to call the doctor   Call your doctor if you have:     A fever above 101 F (38.3 C) (taken under the tongue), or a fever or chills lasting more than a day.     Redness at the incision site.     Any fluid or blood draining from the incision, especially if it smells bad.      Severe pain that doesn t improve with pain medicine.      We will call you 2 to 4 days after surgery to review this handout, answer questions and help arrange after-surgery care. If you have questions or concerns, please call [phone] during regular office hours. If you need to call after business hours, call [phone] and ask to page the surgeon on-call.             Transversus Abdominis Plane (TAP) Pain Block      What is a TAP block?   A TAP block can help you manage your pain after surgery. TAP stands for transversus abdominis plane, which is a muscle layer in your abdomen (belly). The TAP block uses numbing medicine similar to Novocaine to block pain near the site of your surgery.       Why get a TAP block?     To better manage your pain after surgery. A tap block will help keep the pain from getting severe and out of control.     To block pain signals from the nerve, which helps decrease pain after surgery.     To help you sleep, easily breathe deeply, walk and visit with others.      How is it done?   You will lie still on a table. We will use an ultrasound machine to help us see the correct muscle layer of your abdomen. Then, we ll use a needle to inject the medicine. We may also give you some sleep medicine to lessen the pain of the injection.       The procedure takes between 5 and 15 minutes. It  is usually done right before surgery, but will sometimes be after. It depends on your surgery and care needs.      What can I expect?     You may feel numbness, tingling or a heaviness in your abdomen.      You may have pain control up to 72 hours after surgery.     The TAP block may not lessen all of your surgery pain. But most patients feel 50 to 75 percent less pain than without the block.       Tell your nurse if you have:     Numbness or tingling in areas other than where the injection was     Blurry vision     Ringing in your ears     A metallic taste in your mouth

## 2021-09-28 ENCOUNTER — TELEPHONE (OUTPATIENT)
Dept: SURGERY | Facility: CLINIC | Age: 51
End: 2021-09-28

## 2021-09-28 NOTE — TELEPHONE ENCOUNTER
Attempted to contact patient in order to schedule surgery, no answer. Left voicemail and MyChart instructing patient to return call to [phone].

## 2021-10-12 ENCOUNTER — ANCILLARY PROCEDURE (OUTPATIENT)
Dept: MAMMOGRAPHY | Facility: CLINIC | Age: 51
End: 2021-10-12
Payer: COMMERCIAL

## 2021-10-12 DIAGNOSIS — Z12.31 VISIT FOR SCREENING MAMMOGRAM: ICD-10-CM

## 2021-10-12 PROCEDURE — 77063 BREAST TOMOSYNTHESIS BI: CPT | Mod: GC | Performed by: RADIOLOGY

## 2021-10-12 PROCEDURE — 77067 SCR MAMMO BI INCL CAD: CPT | Mod: GC | Performed by: RADIOLOGY

## 2021-10-15 ENCOUNTER — TELEPHONE (OUTPATIENT)
Dept: OBGYN | Facility: CLINIC | Age: 51
End: 2021-10-15

## 2021-10-15 NOTE — TELEPHONE ENCOUNTER
Spoke with patient, rescheduled appointment from 11/24 to 11/10 as the provider will no longer be available on 11/24.    Shira Tamayo  Clinical Services Assistant

## 2021-11-10 ENCOUNTER — OFFICE VISIT (OUTPATIENT)
Dept: OBGYN | Facility: CLINIC | Age: 51
End: 2021-11-10
Attending: OBSTETRICS & GYNECOLOGY
Payer: COMMERCIAL

## 2021-11-10 VITALS
HEIGHT: 69 IN | WEIGHT: 149 LBS | SYSTOLIC BLOOD PRESSURE: 106 MMHG | DIASTOLIC BLOOD PRESSURE: 75 MMHG | BODY MASS INDEX: 22.07 KG/M2 | HEART RATE: 80 BPM

## 2021-11-10 DIAGNOSIS — Z01.419 ENCOUNTER FOR GYNECOLOGICAL EXAMINATION WITHOUT ABNORMAL FINDING: Primary | ICD-10-CM

## 2021-11-10 DIAGNOSIS — Z12.4 SCREENING FOR MALIGNANT NEOPLASM OF CERVIX: ICD-10-CM

## 2021-11-10 PROCEDURE — G0101 CA SCREEN;PELVIC/BREAST EXAM: HCPCS | Performed by: OBSTETRICS & GYNECOLOGY

## 2021-11-10 PROCEDURE — G0145 SCR C/V CYTO,THINLAYER,RESCR: HCPCS | Performed by: OBSTETRICS & GYNECOLOGY

## 2021-11-10 PROCEDURE — 87624 HPV HI-RISK TYP POOLED RSLT: CPT | Performed by: OBSTETRICS & GYNECOLOGY

## 2021-11-10 PROCEDURE — G0463 HOSPITAL OUTPT CLINIC VISIT: HCPCS | Mod: 25

## 2021-11-10 ASSESSMENT — ANXIETY QUESTIONNAIRES
2. NOT BEING ABLE TO STOP OR CONTROL WORRYING: NOT AT ALL
GAD7 TOTAL SCORE: 2
5. BEING SO RESTLESS THAT IT IS HARD TO SIT STILL: SEVERAL DAYS
1. FEELING NERVOUS, ANXIOUS, OR ON EDGE: NOT AT ALL
6. BECOMING EASILY ANNOYED OR IRRITABLE: NOT AT ALL
3. WORRYING TOO MUCH ABOUT DIFFERENT THINGS: NOT AT ALL
7. FEELING AFRAID AS IF SOMETHING AWFUL MIGHT HAPPEN: SEVERAL DAYS

## 2021-11-10 ASSESSMENT — PAIN SCALES - GENERAL: PAINLEVEL: NO PAIN (0)

## 2021-11-10 ASSESSMENT — MIFFLIN-ST. JEOR: SCORE: 1350.24

## 2021-11-10 ASSESSMENT — PATIENT HEALTH QUESTIONNAIRE - PHQ9: 5. POOR APPETITE OR OVEREATING: NOT AT ALL

## 2021-11-10 NOTE — PROGRESS NOTES
SUBJECTIVE   Gemma Garvey is a 50 year old , Patient's last menstrual period was 2011., here for a breast and pelvic exam.    Specific concerns today:  --Breast follow up: strong family history (two sisters, age 46 and 49), had been doing yearly mammograms and yearly MRI interspersed, wondering if should continue this schedule, kind of likes only doing yearly  --Previous urge incontinence sx, much improved with pelvic PT, wondering about prolapse  --Due for annual gyn exam, had pap with [place] physician 2020 and was normal, denies abnormals previously, unsure if had HPV checked too    Gynecologic History  Patient's last menstrual period was 2011.  LNG-IUD until 2020, was amenorrheic with it for 7 years, has had no bleeding since removal  Denies flashes/night sweats/mood changes/sleep disturbances  Menstrual History:  Menstrual History 2011   LAST MENSTRUAL PERIOD 2011     Current contraception: menopause  Lab Results   Component Value Date    PAP NIL 2007      History of abnormal Pap smear: No    Obstetric History  OB History    Para Term  AB Living   3 2 2 0 1 2   SAB IAB Ectopic Multiple Live Births   1 0 0 0 2      # Outcome Date GA Lbr Stef/2nd Weight Sex Delivery Anes PTL Lv   3 Term 03 40w0d  4.054 kg (8 lb 15 oz) M    NITIN      Name: Joey   2 Term 00 40w0d  4.026 kg (8 lb 14 oz) F    NITIN      Name: Iris   1 SAB                 Past Medical History  Past Medical History:   Diagnosis Date     Anemia, unspecified '93    Anemia- now resolved     Depressive disorder, not elsewhere classified '98    Depression (non-psychotic)-resolved with Paxil     MEDICAL HISTORY OF -     uterine prolapse       Past Surgical History  Past Surgical History:   Procedure Laterality Date     HERNIA REPAIR         Medications  Current Outpatient Medications   Medication     cyanocobalamin (VITAMIN B-12) 100 MCG  tablet     ferrous sulfate (FEROSUL) 325 (65 Fe) MG tablet     fish oil-omega-3 fatty acids 1000 MG capsule     LORazepam (ATIVAN) 0.5 MG tablet     Vitamin D3 (CHOLECALCIFEROL) 25 mcg (1000 units) tablet     No current facility-administered medications for this visit.       Allergies     Allergies   Allergen Reactions     Cats      No Known Drug Allergies        Social History  Social History     Socioeconomic History     Marital status:      Spouse name: Not on file     Number of children: 1     Years of education: Not on file     Highest education level: Not on file   Occupational History     Not on file   Tobacco Use     Smoking status: Never Smoker     Smokeless tobacco: Never Used   Substance and Sexual Activity     Alcohol use: Yes     Comment: 3 times per month     Drug use: No     Sexual activity: Not on file   Other Topics Concern     Parent/sibling w/ CABG, MI or angioplasty before 65F 55M? Not Asked   Social History Narrative    [place]oved [place] now back to [place] in     Son at  of [place] for school, daughter in [place]    Works as          Social Determinants of Health     Financial Resource Strain: Not on file   Food Insecurity: Not on file   Transportation Needs: Not on file   Physical Activity: Not on file   Stress: Not on file   Social Connections: Not on file   Intimate Partner Violence: Not on file   Housing Stability: Not on file       Family History  Family History   Problem Relation Age of Onset     Hypertension Father          from pulmonary embolism     Circulatory Father         PE     Hypertension Mother      Cerebrovascular Disease Maternal Grandmother      Hypertension Sister      Neurologic Disorder Sister         migraines     Breast Cancer Paternal Aunt        Review of Systems  CONSTITUTIONAL: NEGATIVE for fever, chills  EYES: NEGATIVE for vision changes   RESP: NEGATIVE for significant cough or SOB  CV: NEGATIVE for chest pain, palpitations   GI:  "NEGATIVE for nausea, abdominal pain, heartburn, or change in bowel habits  : NEGATIVE for frequency, dysuria, or hematuria  MUSCULOSKELETAL: NEGATIVE for significant arthralgias or myalgia  NEURO: NEGATIVE for weakness, dizziness or paresthesias or headache    OBJECTIVE   /75   Pulse 80   Ht 1.753 m (5' 9\")   Wt 67.6 kg (149 lb)   LMP 2011   Breastfeeding No   BMI 22.00 kg/m      General:  Alert, no distress   HEENT:  Normocephalic, without obvious abnormality   Breasts: Within normal limits bilaterally, no LAD  No masses/lumps/tenderness or nipple discharge   Pelvic: -normal external genitalia, no lesions  -normal urethral meatus  -vagina normal without discharge  -cervix normal in appearance, no masses  -some laxity anterior vaginal wall  -uterus small, AV, mobile, NT  -no adnexal masses, NT  -normal appearing anus, perineum   Extremities:  normal     ASSESSMENT   Gemma Garvey is a 50 year old , breast and pelvic exam within normal limits.    PLAN     1. Cervical cancer screening: cotesting today  2. Breast screening: 10/2021 BI-RADS Category 1: Negative (with thompson), encouraged f/u with Breast specialist for screening recommendations given family history, minimum yearly  3. Bone density screening: n/a  4. Menopause: ed packet given, reviewed transition/possible sx    RTC in one year for annual exam or with concerns.    45 minutes spent on the date of the encounter doing chart review (previous clinic visits, hospital records, lab results, imaging studies, and procedural documentation) and the patient's history and exam, documentation and further activities as noted above.    Danielle Bauer MD MPH        "

## 2021-11-10 NOTE — LETTER
11/10/2021       RE: Gemma Garvey  [address]  [facility] [address]     Dear Colleague,    Thank you for referring your patient, Gemma Garvey, to the [facility] [place] at [facility]. Please see a copy of my visit note below.    SUBJECTIVE   Gemma Garvey is a 50 year old , Patient's last menstrual period was 2011., here for a breast and pelvic exam.    Specific concerns today:  --Breast follow up: strong family history (two sisters, age 46 and 49), had been doing yearly mammograms and yearly MRI interspersed, wondering if should continue this schedule, kind of likes only doing yearly  --Previous urge incontinence sx, much improved with pelvic PT, wondering about prolapse  --Due for annual gyn exam, had pap with [place] physician 2020 and was normal, denies abnormals previously, unsure if had HPV checked too    Gynecologic History  Patient's last menstrual period was 2011.  LNG-IUD until 2020, was amenorrheic with it for 7 years, has had no bleeding since removal  Denies flashes/night sweats/mood changes/sleep disturbances  Menstrual History:  Menstrual History 2011   LAST MENSTRUAL PERIOD 2011     Current contraception: menopause  Lab Results   Component Value Date    PAP NIL 2007      History of abnormal Pap smear: No    Obstetric History  OB History    Para Term  AB Living   3 2 2 0 1 2   SAB IAB Ectopic Multiple Live Births   1 0 0 0 2      # Outcome Date GA Lbr Stef/2nd Weight Sex Delivery Anes PTL Lv   3 Term 03 40w0d  4.054 kg (8 lb 15 oz) M    NITIN      Name: Joey   2 Term 00 40w0d  4.026 kg (8 lb 14 oz) F    NITIN      Name: Iris   1 SAB                 Past Medical History  Past Medical History:   Diagnosis Date     Anemia, unspecified '    Anemia- now resolved     Depressive disorder, not  elsewhere classified '98    Depression (non-psychotic)-resolved with Paxil     MEDICAL HISTORY OF -     uterine prolapse       Past Surgical History  Past Surgical History:   Procedure Laterality Date     HERNIA REPAIR         Medications  Current Outpatient Medications   Medication     cyanocobalamin (VITAMIN B-12) 100 MCG tablet     ferrous sulfate (FEROSUL) 325 (65 Fe) MG tablet     fish oil-omega-3 fatty acids 1000 MG capsule     LORazepam (ATIVAN) 0.5 MG tablet     Vitamin D3 (CHOLECALCIFEROL) 25 mcg (1000 units) tablet     No current facility-administered medications for this visit.       Allergies     Allergies   Allergen Reactions     Cats      No Known Drug Allergies        Social History  Social History     Socioeconomic History     Marital status:      Spouse name: Not on file     Number of children: 1     Years of education: Not on file     Highest education level: Not on file   Occupational History     Not on file   Tobacco Use     Smoking status: Never Smoker     Smokeless tobacco: Never Used   Substance and Sexual Activity     Alcohol use: Yes     Comment: 3 times per month     Drug use: No     Sexual activity: Not on file   Other Topics Concern     Parent/sibling w/ CABG, MI or angioplasty before 65F 55M? Not Asked   Social History Narrative    [place] now back [place] in     Son at [employer] for school, daughter in [place]    Works as          Social Determinants of Health     Financial Resource Strain: Not on file   Food Insecurity: Not on file   Transportation Needs: Not on file   Physical Activity: Not on file   Stress: Not on file   Social Connections: Not on file   Intimate Partner Violence: Not on file   Housing Stability: Not on file       Family History  Family History   Problem Relation Age of Onset     Hypertension Father          from pulmonary embolism     Circulatory Father         PE     Hypertension Mother      Cerebrovascular Disease  "Maternal Grandmother      Hypertension Sister      Neurologic Disorder Sister         migraines     Breast Cancer Paternal Aunt        Review of Systems  CONSTITUTIONAL: NEGATIVE for fever, chills  EYES: NEGATIVE for vision changes   RESP: NEGATIVE for significant cough or SOB  CV: NEGATIVE for chest pain, palpitations   GI: NEGATIVE for nausea, abdominal pain, heartburn, or change in bowel habits  : NEGATIVE for frequency, dysuria, or hematuria  MUSCULOSKELETAL: NEGATIVE for significant arthralgias or myalgia  NEURO: NEGATIVE for weakness, dizziness or paresthesias or headache    OBJECTIVE   /75   Pulse 80   Ht 1.753 m (5' 9\")   Wt 67.6 kg (149 lb)   LMP 2011   Breastfeeding No   BMI 22.00 kg/m      General:  Alert, no distress   HEENT:  Normocephalic, without obvious abnormality   Breasts: Within normal limits bilaterally, no LAD  No masses/lumps/tenderness or nipple discharge   Pelvic: -normal external genitalia, no lesions  -normal urethral meatus  -vagina normal without discharge  -cervix normal in appearance, no masses  -some laxity anterior vaginal wall  -uterus small, AV, mobile, NT  -no adnexal masses, NT  -normal appearing anus, perineum   Extremities:  normal     ASSESSMENT   Gemma Garvey is a 50 year old , breast and pelvic exam within normal limits.    PLAN     1. Cervical cancer screening: cotesting today  2. Breast screening: 10/2021 BI-RADS Category 1: Negative (with thompson), encouraged f/u with Breast specialist for screening recommendations given family history, minimum yearly  3. Bone density screening: n/a  4. Menopause: ed packet given, reviewed transition/possible sx    RTC in one year for annual exam or with concerns.    45 minutes spent on the date of the encounter doing chart review (previous clinic visits, hospital records, lab results, imaging studies, and procedural documentation) and the patient's history and exam, documentation and further activities as " noted above.    Danielle Bauer MD MPH

## 2021-11-11 ASSESSMENT — PATIENT HEALTH QUESTIONNAIRE - PHQ9: SUM OF ALL RESPONSES TO PHQ QUESTIONS 1-9: 3

## 2021-11-11 ASSESSMENT — ANXIETY QUESTIONNAIRES: GAD7 TOTAL SCORE: 2

## 2021-11-12 LAB
BKR LAB AP GYN ADEQUACY: NORMAL
BKR LAB AP GYN INTERPRETATION: NORMAL
BKR LAB AP HPV REFLEX: NORMAL
BKR LAB AP PREVIOUS ABNORMAL: NORMAL
PATH REPORT.COMMENTS IMP SPEC: NORMAL
PATH REPORT.COMMENTS IMP SPEC: NORMAL
PATH REPORT.RELEVANT HX SPEC: NORMAL

## 2021-11-16 LAB
HUMAN PAPILLOMA VIRUS 16 DNA: NEGATIVE
HUMAN PAPILLOMA VIRUS 18 DNA: NEGATIVE
HUMAN PAPILLOMA VIRUS FINAL DIAGNOSIS: NORMAL
HUMAN PAPILLOMA VIRUS OTHER HR: NEGATIVE

## 2022-01-03 ENCOUNTER — PATIENT OUTREACH (OUTPATIENT)
Dept: SURGERY | Facility: CLINIC | Age: 52
End: 2022-01-03
Payer: COMMERCIAL

## 2022-01-03 NOTE — PROGRESS NOTES
Patient met with Dr. Alonso 9/27/21 regarding a ventral hernia. OVH was recommended at the ASC with H&P with PCP.    Spoke with the patient and she would like to delay scheduling for several months.  She will contact this office when she is ready to schedule.

## 2022-03-16 ENCOUNTER — OFFICE VISIT (OUTPATIENT)
Dept: INTERNAL MEDICINE | Facility: CLINIC | Age: 52
End: 2022-03-16
Payer: COMMERCIAL

## 2022-03-16 VITALS
HEIGHT: 69 IN | DIASTOLIC BLOOD PRESSURE: 76 MMHG | SYSTOLIC BLOOD PRESSURE: 114 MMHG | HEART RATE: 63 BPM | OXYGEN SATURATION: 99 % | BODY MASS INDEX: 22 KG/M2

## 2022-03-16 DIAGNOSIS — L30.9 LIP LICKING DERMATITIS: Primary | ICD-10-CM

## 2022-03-16 PROCEDURE — 99213 OFFICE O/P EST LOW 20 MIN: CPT | Performed by: PEDIATRICS

## 2022-03-16 ASSESSMENT — PAIN SCALES - GENERAL: PAINLEVEL: NO PAIN (0)

## 2022-03-16 NOTE — PROGRESS NOTES
"Dear patient. Thank you for visiting with me. I want you to feel respected, understood, and empowered. \"Respect\" is valuing you as much as I would a close family member. \"Empowerment\" happens when you are fully informed, and can make the best possible decision for you.  Please ask me questions!  Challenge anything that is not clear.    Medical records are primarily used as memory aids for me and my colleagues. Things to know about my documentation style:  - The 'problem list' includes current symptoms or diagnoses, and some problems that are resolved but may return. I use the past medical history for problems not expected to return.  - I use single quotation marks for things that you or I said, when I want to clarify who was speaking.  - I use double quotation marks when copying a term from elsewhere in your records. Italics (besides here) may also denote a quotation.  If you have questions or concerns, please contact me; I will reply as soon as time allows.    Subjective     Gemma Garvey is a 51 year old woman, with concerns including:  Chief Complaint   Patient presents with     Derm Problem     Dermatitis around mouth per patient     PCP: Yvrose Garcia   Visit type: problem-oriented    Was in [place] and developed irritation around mouth. Figured it was chapped and applied some products including Oseas bees. Not aware of excessive licking (See below).    Has researched perioral dermatitis.     Objective     /76 (BP Location: Right arm, Patient Position: Sitting, Cuff Size: Adult Regular)   Pulse 63   Ht 1.753 m (5' 9\")   LMP 11/26/2011   SpO2 99%   BMI 22.00 kg/m      Physical Exam     Roughened erythema medial third upper lip 3mm max, and lower lip 5 mm max. Spared angles. No sparing around vermilion border.  Slight impetiginous features on lower lips (yellow, crusting) and minimal yellow tinge on right side of upper lip.    I observed 2x (with us discussing) her moistening her lips with a " motion I have no word for, like smacking lipstick without the sound. This motion buried the affected areas, but wouldn't have added much moisture to the areas.    Assessment & Plan     Nonspecific dermatitis, general category of 'lip licking' rather than classic perioral dermatitis. May have worsened if steroids were applied. May have trouble with nocturnal moistening (see above) or licking.  Based on history and available objective information, there seems to be no significant chance of more concerning differential diagnosis such as HSV.  Seems to have slight impetiginous superinfection. Taught local care including dabbing of lower area with topical bacitracin (gave her packets), twice or three times daily for next several days. Barrier treatment to reduce trouble with vaseline or crisco. Drink plenty of fluids.  Avoid corticosteroids (hydrocortisone), which worsen this problem for unknown reasons.     If worsens can advance treatment, e.g. topical erythromycin or perhaps tacrolimus. Also discussed possibility of oral antibiotics.

## 2022-03-16 NOTE — NURSING NOTE
Chief Complaint   Patient presents with     Derm Problem     Dermatitis around mouth per patient       JEFFREY Clarke at 9:44 AM on 3/16/2022.

## 2022-06-15 ENCOUNTER — TELEPHONE (OUTPATIENT)
Dept: OBGYN | Facility: CLINIC | Age: 52
End: 2022-06-15
Payer: COMMERCIAL

## 2022-06-15 DIAGNOSIS — Z80.3 FAMILY HISTORY OF MALIGNANT NEOPLASM OF BREAST: Primary | ICD-10-CM

## 2022-06-15 NOTE — TELEPHONE ENCOUNTER
----- Message from Aisha Moreno RN sent at 6/15/2022  2:23 PM CDT -----  Regarding: questions and info to breast specialist  Patient saw Dr. Bauer in 2021 as new patient and discussed about seeing a breast specialist due to two sisters have breast cancer. Would just like to know where to call.

## 2022-06-16 ENCOUNTER — PATIENT OUTREACH (OUTPATIENT)
Dept: ONCOLOGY | Facility: CLINIC | Age: 52
End: 2022-06-16
Payer: COMMERCIAL

## 2022-06-16 NOTE — CONFIDENTIAL NOTE
Referral placed for breast center.  Tried to reach Gemma, but received voicemail.  Left message that referral was sent and gave breast center contact information.    Danielle Buaer MD  You 1 hour ago (7:09 AM)     CB    Breast center NP!   :)    Message text       You  Danielle Bauer MD 17 hours ago (3:13 PM)     CF    Please advise follow up-were you thinking genetic counselor?  Breast center NP?    Routing comment

## 2022-06-16 NOTE — PROGRESS NOTES
New Patient Oncology Nurse Navigator Note     Referring provider: Danielle Bauer MD in [facility]     Referred to (specialty): Medical Oncology    Requested provider (if applicable): [facility]     Date Referral Received: 06/16/22    Evaluation for : family history of breast CA     Referral received and reviewed.  Referring provider specifically requesting for patient to discuss with breast center NP.  I will forward referral on to new patient scheduling with instructions:  SCHEDULE - LORENA HERNANDEZ at , 60 min in person.

## 2022-07-11 NOTE — PROGRESS NOTES
NEW CONSULTATION   2022     Gemma Garvey is a 51 year old woman who presents with a family history of breast cancer. She was referred by Dr. Bauer.    HPI:    Family history of 2 sisters with breast cancer at age 46 and 49. She was previously doing high risk screening in [place]. She did stop doing high risk screening because she got sick of it. Negative genetic testing .     She denies any breast concerns today including mass skin change, nipple inversion or nipple discharge.     BREAST-SPECIFIC HISTORY:    Previous breast imaging: Yes  3/1/13  3/12/13  3/13/14  4/30/15  3/25/16  - 21 breast MRI negative per patient.   - 10/2021 [facility] BI-RADS 1    Prior breast biopsies/surgeries: No    Prior history of breast cancer or DCIS: No  Prior radiation history: No  Self breast exams: No  Breast density: heterogeneously dense    GYN HISTORY:  . Age at 1st pregnancy: 29. Breastfeeding history: Yes.   Age at menarche: 11  Menopausal: unsure but no longer gets period  Menopausal hormone replacement therapy: No     RISK ASSESSMENT: > 3% 5 year risk and > 20% lifetime risk   Tricia: 3.2% 5 year risk   [place]: 28.6% 10 year risk   Tung: 20.9% lifetime risk    FAMILY HISTORY:  Breast ca: Yes   - sister, 46   - sister, 49 negative genetic testing .  - paternal aunt 40's   Ovarian ca: No  Pancreatic ca: No  Prostate: No  Gastric ca: No  Melanoma: No   Colon ca: No  Other cancer: No  Other genetic, testing, syndromes, or clotting disorders: no     PAST MEDICAL HISTORY  Past Medical History:   Diagnosis Date     Anemia, unspecified '    Anemia- now resolved     Depressive disorder, not elsewhere classified '98    Depression (non-psychotic)-resolved with Paxil     PAST SURGICAL HISTORY   Past Surgical History:   Procedure Laterality Date     HERNIA REPAIR  2013     MEDICATIONS  Current Outpatient Medications   Medication Sig Dispense Refill     cyanocobalamin (VITAMIN B-12) 100 MCG tablet  "Take 100 mcg by mouth daily       ferrous sulfate (FEROSUL) 325 (65 Fe) MG tablet Take 325 mg by mouth daily (with breakfast)       fish oil-omega-3 fatty acids 1000 MG capsule Take 2 g by mouth daily       LORazepam (ATIVAN) 0.5 MG tablet Take 0.5 mg by mouth every 6 hours as needed for anxiety       Vitamin D3 (CHOLECALCIFEROL) 25 mcg (1000 units) tablet Take by mouth daily       ALLERGIES  Allergies   Allergen Reactions     Cats      No Known Drug Allergies       SOCIAL HISTORY:  Smokes: No  EtOH: No  Exercise: active daily   Recently moved here from [place]. She works in a senior living facility.     ROS:  Change in vision No  Headaches: no  Respiratory: No shortness of breath, dyspnea on exertion, cough, or hemoptysis   Cardiovascular: negative   Gastrointestinal: negative Abdominal pain: no  Breast: negative  Musculoskeletal: negative Joint pain No Back pain: no  Psychiatric: negative  Hematologic/Lymphatic/Immunologic: negative  Endocrine: negative    EXAM  /75 (BP Location: Right arm, Patient Position: Sitting, Cuff Size: Adult Regular)   Pulse 58   Temp 98.1  F (36.7  C) (Oral)   Resp 16   Ht 1.759 m (5' 9.25\")   Wt 67.8 kg (149 lb 8 oz)   LMP 11/26/2011   SpO2 97%   BMI 21.92 kg/m     PHYSICAL EXAM  Respiratory: breathing non labored.   Breasts: Examination was done in both the upright and supine positions.  Breasts are symmetrical . No dominant fixed or suspicious masses noted. No skin or nipple changes. No nipple discharge. Nodular fibroglandular tissue in right breast at 6:00 and left breast at 12:00 and upper out quadrant.   No clavicular or axillary lymphadenopathy. Bilateral anterior cervical lymph adenopathy.     ASSESSMENT/PLAN:    Gemma Garvey is a 51 year old woman with family history of breast cancer. She meets NCCN guidelines for high risk screening and risk reduction.     1) Family history of breast cancer  Discussed she meets NCCN guidelines for high risk screening based " on family history with lifetime risk for breast cancer of >20%. Screening recommendations based on NCCN guidelines. Be familiar with your breast and promptly report any changes to your health care provider. Clinical encounter every 6-12 months starting now (but not prior to age 21). Annual mammogram with thompson starting 10 years prior to the youngest family member but not less than age 30 or age 40 ( whichever comes first). Annual breast MRI to begin 10 years prior to the youngest family member diagnosed but not less than 25 years old or age 40 ( whichever comes first).  Counseling was provided with available strategies including lifestyle modifications and risk reducing intervention.   - Breast MRI due and ordered.   - Screening mammogram with clinic visit due: 10/21/22  - We discussed a referral to a genetic counselor.    2) Risk reduction  She is a candidate for breast cancer risk-reducing intervention based on a Tricia estimated 5 year risk. Counseling was provided with available strategies including lifestyle modifications and risk reducing therapy. Women have been showen to have a reduced risk of breast cancer by 50%. Freedman's risk/benefits tables were reviewed and the benefits of Tamoxifen do no appear to outweigh the potential risks and there is moderate evidence that the benefits of raloxifene outweigh the potential risk. The contraindications for Tamoxifen and raloxifene (history of deep vein thrombosis or pulmonary embolism, history or stroke, history of TIA, known inherited clotting trait, pregnancy, and potential pregnancy without an effective nonhormal method of method of contraception) along with potential side effects (hot flashes, mood disturbances, weight gain, deep vein thrombosis, pulmonary embolism (1 vs 0.3 per 1000 annually), stroke, cataracts, and endometrial cancer (2.3 vs 0.91 per 1000 annually)) including signs and symptoms were discussed. While Raloxifene in long-term follow up appears to  be less efficacious in risk reduction than tamoxifen, consideration of toxicity may still lead to the choice of raloxifene over tamoxifen in women with an intact uterus. We reviewed the results of a trial of tamoxifen 5 mg vs 20 mg in the adjuvant treatment of patients with DCIS that was presented at the [facility] in 2018.  This trial showed that low dose tamoxifen was equally effective as high dose in prevention of future breast cancers and had less risk of thrombosis and endometrial cancer.    - After shared decision making she will continue with lifestyle modifications for risk reduction. If she does wish to start risk reduction medications I would recommend low dose Tamoxifen or Raloxifene.     3) Lifestyle Modifications were provided.   - Maintain your best healthy weight. Higher body fat and adult weight gain is associated with increased risk for breast cancer. This increase in risk has been attributed to increase in circulating endogenous estrogen levels from fat tissue.   - Alcohol can raise estrogen. Alcohol consumption, even at moderate levels (1-2 drinks per day), increases breast cancer risk and are best avoided. If you choose to drink alcohol limit alcohol consumption to less than 1 drink per day. (1 ounce of liquor, 6 ounces of wine, or 8 ounces of beer).  - Be active daily and void being sedentary.     4) Anterior cervical lymph adenopathy  - Follow up with primary care provider.     Tasia Mcdaniel PA-C    30 minutes spent on the date of the encounter doing chart review, review of test results, interpretation of tests, patient visit and documentation.

## 2022-07-13 ENCOUNTER — OFFICE VISIT (OUTPATIENT)
Dept: SURGERY | Facility: CLINIC | Age: 52
End: 2022-07-13
Attending: OBSTETRICS & GYNECOLOGY
Payer: COMMERCIAL

## 2022-07-13 ENCOUNTER — PATIENT OUTREACH (OUTPATIENT)
Dept: ONCOLOGY | Facility: CLINIC | Age: 52
End: 2022-07-13

## 2022-07-13 VITALS
HEART RATE: 58 BPM | WEIGHT: 149.5 LBS | RESPIRATION RATE: 16 BRPM | TEMPERATURE: 98.1 F | OXYGEN SATURATION: 97 % | DIASTOLIC BLOOD PRESSURE: 75 MMHG | BODY MASS INDEX: 22.14 KG/M2 | HEIGHT: 69 IN | SYSTOLIC BLOOD PRESSURE: 113 MMHG

## 2022-07-13 DIAGNOSIS — Z91.89 AT HIGH RISK FOR BREAST CANCER: Primary | ICD-10-CM

## 2022-07-13 DIAGNOSIS — Z80.3 FAMILY HISTORY OF MALIGNANT NEOPLASM OF BREAST: ICD-10-CM

## 2022-07-13 DIAGNOSIS — Z12.39 BREAST CANCER SCREENING, HIGH RISK PATIENT: ICD-10-CM

## 2022-07-13 PROCEDURE — G0463 HOSPITAL OUTPT CLINIC VISIT: HCPCS

## 2022-07-13 PROCEDURE — 99214 OFFICE O/P EST MOD 30 MIN: CPT | Performed by: PHYSICIAN ASSISTANT

## 2022-07-13 ASSESSMENT — PAIN SCALES - GENERAL: PAINLEVEL: NO PAIN (0)

## 2022-07-13 NOTE — NURSING NOTE
"Oncology Rooming Note    July 13, 2022 7:03 AM   Gemma Garvey is a 51 year old female who presents for:    Chief Complaint   Patient presents with     Oncology Clinic Visit     Family Hx of Breast Cancer     Initial Vitals: /75 (BP Location: Right arm, Patient Position: Sitting, Cuff Size: Adult Regular)   Pulse 58   Temp 98.1  F (36.7  C) (Oral)   Resp 16   Ht 1.759 m (5' 9.25\")   Wt 67.8 kg (149 lb 8 oz)   LMP 11/26/2011   SpO2 97%   BMI 21.92 kg/m   Estimated body mass index is 21.92 kg/m  as calculated from the following:    Height as of this encounter: 1.759 m (5' 9.25\").    Weight as of this encounter: 67.8 kg (149 lb 8 oz). Body surface area is 1.82 meters squared.  No Pain (0) Comment: Data Unavailable   Patient's last menstrual period was 11/26/2011.  Allergies reviewed: Yes  Medications reviewed: Yes    Medications: Medication refills not needed today.  Pharmacy name entered into [employer]:    [facility] - [place], [place] - [address] [id number] - [place], [place] - [address] [place] [address]    Clinical concerns: Pt presents today as a New Patient       Lissy Dueñas LPN  7/13/2022              "

## 2022-07-13 NOTE — PATIENT INSTRUCTIONS
Gemma Garvey is a 51 year old woman with family history of breast cancer. She meets NCCN guidelines for high risk screening and risk reduction.     1) Family history of breast cancer  Discussed she meets NCCN guidelines for high risk screening based on family history with lifetime risk for breast cancer of >20%. Screening recommendations based on NCCN guidelines. Be familiar with your breast and promptly report any changes to your health care provider. Clinical encounter every 6-12 months starting now (but not prior to age 21). Annual mammogram with thompson starting 10 years prior to the youngest family member but not less than age 30 or age 40 ( whichever comes first). Annual breast MRI to begin 10 years prior to the youngest family member diagnosed but not less than 25 years old or age 40 ( whichever comes first).  Counseling was provided with available strategies including lifestyle modifications and risk reducing intervention.   - Breast MRI due and ordered.   - Screening mammogram with clinic visit due: 10/21/22  - We discussed a referral to a genetic counselor.    2) Risk reduction  She is a candidate for breast cancer risk-reducing intervention based on a Tricia estimated 5 year risk. Counseling was provided with available strategies including lifestyle modifications and risk reducing therapy. Women have been showen to have a reduced risk of breast cancer by 50%. Freedman's risk/benefits tables were reviewed and the benefits of Tamoxifen do no appear to outweigh the potential risks and there is moderate evidence that the benefits of raloxifene outweigh the potential risk. The contraindications for Tamoxifen and raloxifene (history of deep vein thrombosis or pulmonary embolism, history or stroke, history of TIA, known inherited clotting trait, pregnancy, and potential pregnancy without an effective nonhormal method of method of contraception) along with potential side effects (hot flashes, mood disturbances,  weight gain, deep vein thrombosis, pulmonary embolism (1 vs 0.3 per 1000 annually), stroke, cataracts, and endometrial cancer (2.3 vs 0.91 per 1000 annually)) including signs and symptoms were discussed. While Raloxifene in long-term follow up appears to be less efficacious in risk reduction than tamoxifen, consideration of toxicity may still lead to the choice of raloxifene over tamoxifen in women with an intact uterus. We reviewed the results of a trial of tamoxifen 5 mg vs 20 mg in the adjuvant treatment of patients with DCIS that was presented at the [facility] in 2018.  This trial showed that low dose tamoxifen was equally effective as high dose in prevention of future breast cancers and had less risk of thrombosis and endometrial cancer.    - After shared decision making she will continue with lifestyle modifications for risk reduction. If she does wish to start risk reduction medications I would recommend low dose Tamoxifen or Raloxifene.     3) Lifestyle Modifications were provided.   - Maintain your best healthy weight. Higher body fat and adult weight gain is associated with increased risk for breast cancer. This increase in risk has been attributed to increase in circulating endogenous estrogen levels from fat tissue.   - Alcohol can raise estrogen. Alcohol consumption, even at moderate levels (1-2 drinks per day), increases breast cancer risk and are best avoided. If you choose to drink alcohol limit alcohol consumption to less than 1 drink per day. (1 ounce of liquor, 6 ounces of wine, or 8 ounces of beer).  - Be active daily and void being sedentary.

## 2022-07-13 NOTE — LETTER
2022         RE: Gemma Garvey  [address]  [facility] [address]        Dear Colleague,    Thank you for referring your patient, Gemma Garvey, to the [facility]. Please see a copy of my visit note below.    NEW CONSULTATION   2022     Gemma Garvey is a 51 year old woman who presents with a family history of breast cancer. She was referred by Dr. Bauer.    HPI:    Family history of 2 sisters with breast cancer at age 46 and 49. She was previously doing high risk screening in [place]. She did stop doing high risk screening because she got sick of it. Negative genetic testing .     She denies any breast concerns today including mass skin change, nipple inversion or nipple discharge.     BREAST-SPECIFIC HISTORY:    Previous breast imaging: Yes  3/1/13  3/12/13  3/13/14  4/30/15  3/25/16  - 21 breast MRI negative per patient.   - 10/2021 Smammo BI-RADS 1    Prior breast biopsies/surgeries: No    Prior history of breast cancer or DCIS: No  Prior radiation history: No  Self breast exams: No  Breast density: heterogeneously dense    GYN HISTORY:  . Age at 1st pregnancy: 29. Breastfeeding history: Yes.   Age at menarche: 11  Menopausal: unsure but no longer gets period  Menopausal hormone replacement therapy: No     RISK ASSESSMENT: > 3% 5 year risk and > 20% lifetime risk   Tricia: 3.2% 5 year risk   AL[place]ANDRI[place]/Tyrer-Cuzick: 28.6% 10 year risk   Tung: 20.9% lifetime risk    FAMILY HISTORY:  Breast ca: Yes   - sister, 46   - sister, 49 negative genetic testing .  - paternal aunt 40's   Ovarian ca: No  Pancreatic ca: No  Prostate: No  Gastric ca: No  Melanoma: No   Colon ca: No  Other cancer: No  Other genetic, testing, syndromes, or clotting disorders: no     PAST MEDICAL HISTORY  Past Medical History:   Diagnosis Date     Anemia, unspecified '    Anemia- now resolved     Depressive disorder, not elsewhere classified '     "Depression (non-psychotic)-resolved with Paxil     PAST SURGICAL HISTORY   Past Surgical History:   Procedure Laterality Date     HERNIA REPAIR  2013     MEDICATIONS  Current Outpatient Medications   Medication Sig Dispense Refill     cyanocobalamin (VITAMIN B-12) 100 MCG tablet Take 100 mcg by mouth daily       ferrous sulfate (FEROSUL) 325 (65 Fe) MG tablet Take 325 mg by mouth daily (with breakfast)       fish oil-omega-3 fatty acids 1000 MG capsule Take 2 g by mouth daily       LORazepam (ATIVAN) 0.5 MG tablet Take 0.5 mg by mouth every 6 hours as needed for anxiety       Vitamin D3 (CHOLECALCIFEROL) 25 mcg (1000 units) tablet Take by mouth daily       ALLERGIES  Allergies   Allergen Reactions     Cats      No Known Drug Allergies       SOCIAL HISTORY:  Smokes: No  EtOH: No  Exercise: active daily   Recently moved here from [place]. She works in a senior living facility.     ROS:  Change in vision No  Headaches: no  Respiratory: No shortness of breath, dyspnea on exertion, cough, or hemoptysis   Cardiovascular: negative   Gastrointestinal: negative Abdominal pain: no  Breast: negative  Musculoskeletal: negative Joint pain No Back pain: no  Psychiatric: negative  Hematologic/Lymphatic/Immunologic: negative  Endocrine: negative    EXAM  /75 (BP Location: Right arm, Patient Position: Sitting, Cuff Size: Adult Regular)   Pulse 58   Temp 98.1  F (36.7  C) (Oral)   Resp 16   Ht 1.759 m (5' 9.25\")   Wt 67.8 kg (149 lb 8 oz)   LMP 11/26/2011   SpO2 97%   BMI 21.92 kg/m     PHYSICAL EXAM  Respiratory: breathing non labored.   Breasts: Examination was done in both the upright and supine positions.  Breasts are symmetrical . No dominant fixed or suspicious masses noted. No skin or nipple changes. No nipple discharge. Nodular fibroglandular tissue in right breast at 6:00 and left breast at 12:00 and upper out quadrant.   No clavicular or axillary lymphadenopathy. Bilateral anterior cervical lymph adenopathy. "     ASSESSMENT/PLAN:    Gemma Garvey is a 51 year old woman with family history of breast cancer. She meets NCCN guidelines for high risk screening and risk reduction.     1) Family history of breast cancer  Discussed she meets NCCN guidelines for high risk screening based on family history with lifetime risk for breast cancer of >20%. Screening recommendations based on NCCN guidelines. Be familiar with your breast and promptly report any changes to your health care provider. Clinical encounter every 6-12 months starting now (but not prior to age 21). Annual mammogram with thompson starting 10 years prior to the youngest family member but not less than age 30 or age 40 ( whichever comes first). Annual breast MRI to begin 10 years prior to the youngest family member diagnosed but not less than 25 years old or age 40 ( whichever comes first).  Counseling was provided with available strategies including lifestyle modifications and risk reducing intervention.   - Breast MRI due and ordered.   - Screening mammogram with clinic visit due: 10/21/22  - We discussed a referral to a genetic counselor.    2) Risk reduction  She is a candidate for breast cancer risk-reducing intervention based on a Tricia estimated 5 year risk. Counseling was provided with available strategies including lifestyle modifications and risk reducing therapy. Women have been showen to have a reduced risk of breast cancer by 50%. Freedman's risk/benefits tables were reviewed and the benefits of Tamoxifen do no appear to outweigh the potential risks and there is moderate evidence that the benefits of raloxifene outweigh the potential risk. The contraindications for Tamoxifen and raloxifene (history of deep vein thrombosis or pulmonary embolism, history or stroke, history of TIA, known inherited clotting trait, pregnancy, and potential pregnancy without an effective nonhormal method of method of contraception) along with potential side effects (hot  flashes, mood disturbances, weight gain, deep vein thrombosis, pulmonary embolism (1 vs 0.3 per 1000 annually), stroke, cataracts, and endometrial cancer (2.3 vs 0.91 per 1000 annually)) including signs and symptoms were discussed. While Raloxifene in long-term follow up appears to be less efficacious in risk reduction than tamoxifen, consideration of toxicity may still lead to the choice of raloxifene over tamoxifen in women with an intact uterus. We reviewed the results of a trial of tamoxifen 5 mg vs 20 mg in the adjuvant treatment of patients with DCIS that was presented at the [facility] in 2018.  This trial showed that low dose tamoxifen was equally effective as high dose in prevention of future breast cancers and had less risk of thrombosis and endometrial cancer.    - After shared decision making she will continue with lifestyle modifications for risk reduction. If she does wish to start risk reduction medications I would recommend low dose Tamoxifen or Raloxifene.     3) Lifestyle Modifications were provided.   - Maintain your best healthy weight. Higher body fat and adult weight gain is associated with increased risk for breast cancer. This increase in risk has been attributed to increase in circulating endogenous estrogen levels from fat tissue.   - Alcohol can raise estrogen. Alcohol consumption, even at moderate levels (1-2 drinks per day), increases breast cancer risk and are best avoided. If you choose to drink alcohol limit alcohol consumption to less than 1 drink per day. (1 ounce of liquor, 6 ounces of wine, or 8 ounces of beer).  - Be active daily and void being sedentary.     4) Anterior cervical lymph adenopathy  - Follow up with primary care provider.       30 minutes spent on the date of the encounter doing chart review, review of test results, interpretation of tests, patient visit and documentation.         Again, thank you for allowing me to participate in the care of your patient.       Sincerely,    Tasia Mcdaniel PA-C

## 2022-08-08 DIAGNOSIS — Z12.11 SPECIAL SCREENING FOR MALIGNANT NEOPLASMS, COLON: Primary | ICD-10-CM

## 2022-08-15 ENCOUNTER — HOSPITAL ENCOUNTER (OUTPATIENT)
Facility: AMBULATORY SURGERY CENTER | Age: 52
End: 2022-08-15
Attending: INTERNAL MEDICINE
Payer: COMMERCIAL

## 2022-08-15 ENCOUNTER — TELEPHONE (OUTPATIENT)
Dept: GASTROENTEROLOGY | Facility: CLINIC | Age: 52
End: 2022-08-15

## 2022-08-15 NOTE — TELEPHONE ENCOUNTER
Screening Questions    BlueKIND OF PREP RedLOCATION [review exclusion criteria] GreenSEDATION TYPE      1. Are you active on mychart? Y    2. What insurance is in the chart? SUNIL/ZEYAD      3.   Ordering/Referring Provider: BROWN     4. BMI   (If greater than 40 review exclusion criteria [PAC APPT IF [MAC] @ [facility])  22.0  [If yes, BMI OVER 40-EXTENDED PREP]      **(Sedation review/consideration needed)**  Do you have a legal guardian or Medical Power of    and/or are you able to give consent for your medical care?     Y    5. Have you had a positive covid test in the last 90 days?   N - N    6.  Are you currently on dialysis?   N [ If yes, G-PREP & HOSPITAL setting ONLY]     7.  Do you have chronic kidney disease?  N [ If yes, G-PREP ]    8.   Do you have a diagnosis of diabetes?   N   [ If yes, G-PREP ]    9.  On a regular basis do you go 3-5 days between bowel movements?   N   [ If yes, EXTENDED PREP]    10.  Are you taking any prescription pain medications on a routine schedule?    N - N [ If yes, EXTENDED PREP] [If yes, MAC]      11.   Do you have any chemical dependencies such as alcohol, street drugs, or methadone?    N [If yes, MAC]    12.   Do you have any history of post-traumatic stress syndrome, severe anxiety or history of psychosis?    N  [If yes, MAC]    13.  [FEMALES] Are you currently pregnant? N    If yes, how many weeks?       Respiratory/Heart Screening:  [If yes to any of the following HOSPITAL setting only]     14. Do you have Pulmonary Hypertension [Lungs]?   N       15. Do you have UNCONTROLLED asthma?   N     16.  Do you use daily home oxygen?  N      17. Do you have mod to severe Obstructive Sleep Apnea?         (OKAY @ [facility]  [facility]  SH  PH  RI  MG - if pt is not on OXYGEN)  N      18.   Have you had a heart or lung transplant?   N      19.   Have you had a stroke or Transient ischemic attack (TIA - aka  mini stroke ) within 6 months?  (If yes, please review exclusion  criteria)  N     20.   In the past 6 months, have you had any heart related issues including cardiomyopathy or heart attack?   N           If yes, did it require cardiac stenting or other implantable device?   N      21.   Do you have any implantable devices in your body (pacemaker, defib, LVAD)? (If yes, please review exclusion criteria)  N   22.  Do you take the medication Phentermine?  NO        23. Do you take nitroglycerin?   N           If yes, how often? N  (if yes, HOSPITAL setting ONLY)    24.  Are you currently taking any blood thinners?    [If yes, INFORM patient to follw up w/ ORDERING PROVIDER FOR BRIDGING INSTRUCTIONS]     N    25.   Do you transfer independently?                (If NO, please HOSPITAL setting ONLY)  Y    26.   Preferred LOCAL Pharmacy for Pre Prescription:    PATIENT DID NOT KNOW WHAT PHARMACY AT THE TIME DUE TO THEM JUST MOVING        Scheduling Details  (Please ask for phone number if not scheduled by patient)      Caller : ORLANDO   Date of Procedure: 10/8  Surgeon: helen  Location: [facility]        Sedation Type: MODERATE l   Conscious Sedation- Needs  for 6 hours after the procedure  MAC/General-Needs  for 24 hours after procedure    N :[Pre-op Required] at [facility]  MG and OR for MAC sedation   (advise patient they will need a pre-op WITH IN 30 DAYS of procedure date)     Type of Procedure Scheduled:   Lower Endoscopy [Colonoscopy]    Which Colonoscopy Prep was Sent?:   STANDARD GO FORD       MP CF PATIENTS & GROEN'S PATIENTS NEEDS EXTENDED PREP       Informed patient they will need an adult  Y  Cannot take any type of public or medical transportation alone    Pre-Procedure Covid test to be completed at [facility] or Externally: Y  **INFORMED OF HOME TESTING & LAB OPTION**        Confirmed Nurse will call to complete assessment Y    Additional comments: N

## 2022-08-20 ENCOUNTER — HEALTH MAINTENANCE LETTER (OUTPATIENT)
Age: 52
End: 2022-08-20

## 2022-08-20 ENCOUNTER — ANCILLARY PROCEDURE (OUTPATIENT)
Dept: MRI IMAGING | Facility: CLINIC | Age: 52
End: 2022-08-20
Attending: PHYSICIAN ASSISTANT
Payer: COMMERCIAL

## 2022-08-20 DIAGNOSIS — Z12.39 BREAST CANCER SCREENING, HIGH RISK PATIENT: ICD-10-CM

## 2022-08-20 DIAGNOSIS — Z91.89 AT HIGH RISK FOR BREAST CANCER: ICD-10-CM

## 2022-08-20 PROCEDURE — 77049 MRI BREAST C-+ W/CAD BI: CPT | Mod: GC | Performed by: RADIOLOGY

## 2022-08-20 PROCEDURE — A9585 GADOBUTROL INJECTION: HCPCS | Performed by: RADIOLOGY

## 2022-08-20 RX ORDER — GADOBUTROL 604.72 MG/ML
7.5 INJECTION INTRAVENOUS ONCE
Status: COMPLETED | OUTPATIENT
Start: 2022-08-20 | End: 2022-08-20

## 2022-08-20 RX ADMIN — GADOBUTROL 7.5 ML: 604.72 INJECTION INTRAVENOUS at 07:29

## 2022-08-20 NOTE — DISCHARGE INSTRUCTIONS
MRI Contrast Discharge Instructions    The IV contrast you received today will pass out of your body in your  urine. This will happen in the next 24 hours. You will not feel this process.  Your urine will not change color.    Drink at least 4 extra glasses of water or juice today (unless your doctor  has restricted your fluids). This reduces the stress on your kidneys.  You may take your regular medicines.    If you are on dialysis: It is best to have dialysis today.    If you have a reaction: Most reactions happen right away. If you have  any new symptoms after leaving the hospital (such as hives or swelling),  call your hospital at the correct number below. Or call your family doctor.  If you have breathing distress or wheezing, call 911.    Special instructions: ***    I have read and understand the above information.    Signature:______________________________________ Date:___________    Staff:__________________________________________ Date:___________     Time:__________    [facility] Departments:    ___[place]: [phone]  ___[facility]: [phone]  ___[place]: [phone] ___[facility]: [phone]  ___[facility]: [phone]  ___[facility]: [phone]  ___[phone]  ___[facility]: [phone]  ___[phone]

## 2022-09-30 ENCOUNTER — TELEPHONE (OUTPATIENT)
Dept: GASTROENTEROLOGY | Facility: CLINIC | Age: 52
End: 2022-09-30

## 2022-09-30 NOTE — TELEPHONE ENCOUNTER
CANCEL - RESCHEDULE    Ordering Provider: Danielle Bauer MD  Procedure Cancelled: colonoscopy  Procedure Date Cancelled: 11/08  Surgeon of Procedure Cancelled: Burak  Sedation type: MOD   Location of Procedure Cancelled: [facility]      Rescheduled: NO -LVM     If rescheduled:    Date:    [place] [facility]   [place]edation Type: MOD   PAC / Pre-op Required  N   PREP TYPE: GPREP   Covid Test [[facility] - PCR IN FACILITY] HOME   Note any change or update to original order/sedation:    Preferred LOCAL Pharmacy for Pre Prescription         Reason for cancel (please be detailed, any staff messages or encounters to note? SCHED ERROR? FACILITY/ SEDATION CHANGE? ):   Provider on call        Details and Prep sent via     Additional details:

## 2022-10-05 ENCOUNTER — TELEPHONE (OUTPATIENT)
Dept: GASTROENTEROLOGY | Facility: CLINIC | Age: 52
End: 2022-10-05

## 2022-10-05 NOTE — TELEPHONE ENCOUNTER
Caller: Gemma Garvey      Procedure: colon    Date, Location, and Surgeon of Procedure Cancelled: 11/8,[facility]        Reason for cancel (please be detailed, any staff messages or encounters to note?): Sedation change        Rescheduled: n     PATIENT DECIDED NOT TO RESCHEDULE PROCEDURE,INSTEAD CONSULT WITH PRIMARY

## 2022-10-24 NOTE — PROGRESS NOTES
FOLLOW UP  Oct 26, 2022     Gemma Garvey is a 51 year old woman who presents with a family history of breast cancer.      HPI:     Family history of 2 sisters with breast cancer at age 46 and 49. She had negative genetic testing .      She denies any breast concerns today including mass skin change, nipple inversion or nipple discharge. Her last breast MRI was in . She is due for a screening mammogram today.      BREAST-SPECIFIC HISTORY:     Previous breast imaging: Yes  - 3/1/13  - 3/12/13  - 3/13/14  - 4/30/15  - 3/25/16  - 21 breast MRI negative per patient.   - 10/12/2021 Smammo BI-RADS 1  - 22 breast MRI BI-RADS 1     Prior breast biopsies/surgeries: No     Prior history of breast cancer or DCIS: No  Prior radiation history: No  Self breast exams: No  Breast density: heterogeneously dense     GYN HISTORY:  . Age at 1st pregnancy: 29. Breastfeeding history: Yes.   Age at menarche: 11  Menopausal: unsure but no longer gets period  Menopausal hormone replacement therapy: No      RISK ASSESSMENT: > 3% 5 year risk and > 20% lifetime risk   Tricia: 3.2% 5 year risk   AL[place]/Tyrer-Cuzick: 34.0% 10 year risk   Tung: 20.9% lifetime risk     FAMILY HISTORY:  Breast ca: Yes   - sister, 46   - sister, 49 negative genetic testing .  - paternal aunt 40's   Ovarian ca: No  Pancreatic ca: No  Prostate: No  Gastric ca: No  Melanoma: No   Colon ca: No  Other cancer: No  Other genetic, testing, syndromes, or clotting disorders: no     PAST MEDICAL HISTORY  Past Medical History:   Diagnosis Date     Anemia, unspecified '93    Anemia- now resolved     Depressive disorder, not elsewhere classified '98    Depression (non-psychotic)-resolved with Paxil     PAST SURGICAL HISTORY   Past Surgical History:   Procedure Laterality Date     HERNIA REPAIR  2013     MEDICATIONS  Current Outpatient Medications   Medication Sig Dispense Refill     cyanocobalamin (VITAMIN B-12) 100 MCG tablet Take 100 mcg by  mouth daily       ferrous sulfate (FEROSUL) 325 (65 Fe) MG tablet Take 325 mg by mouth daily (with breakfast)       fish oil-omega-3 fatty acids 1000 MG capsule Take 2 g by mouth daily       Vitamin D3 (CHOLECALCIFEROL) 25 mcg (1000 units) tablet Take by mouth daily       LORazepam (ATIVAN) 0.5 MG tablet Take 0.5 mg by mouth every 6 hours as needed for anxiety (Patient not taking: Reported on 10/26/2022)       ALLERGIES  Allergies   Allergen Reactions     Cats      No Known Drug Allergies       SOCIAL HISTORY:  Smokes: No  EtOH: once weekly   Exercise: active daily   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     ROS:  Change in vision No  Headaches: no  Respiratory: No shortness of breath, dyspnea on exertion, cough, or hemoptysis   Cardiovascular: negative   Gastrointestinal: negative Abdominal pain: no  Breast: negative  Musculoskeletal: negative Joint pain No Back pain: no  Psychiatric: negative  Hematologic/Lymphatic/Immunologic: negative  Endocrine: negative    EXAM  /75   Pulse 63   Temp 98.1  F (36.7  C) (Oral)   Wt 70.4 kg (155 lb 3.2 oz)   LMP 11/26/2011   SpO2 99%   BMI 22.75 kg/m     PHYSICAL EXAM  Respiratory: breathing non labored.   Breasts: Examination was done in both the upright and supine positions.  Breasts are symmetrical . No dominant fixed or suspicious masses noted. No skin or nipple changes. No nipple discharge.   No clavicular or axillary lymphadenopathy. Left anterior 3x2 cm cervical lymph, smooth and mobile.     INVESTIGATIONS:    10/26/22 screening mammogram: per Radiology no concerning findings, final report pending.     ASSESSMENT/PLAN:    Gemma Garvey is a 51 year old woman with family history of breast cancer. She meets NCCN guidelines for high risk screening and risk reduction. Risk reduction intervention previously discussed.     1) Family history of breast cancer  Discussed she meets NCCN guidelines for high risk screening based on family  history with lifetime risk for breast cancer of >20%. Screening recommendations based on NCCN guidelines. Be familiar with your breast and promptly report any changes to your health care provider. Clinical encounter every 6-12 months starting now (but not prior to age 21). Annual mammogram with thompson starting 10 years prior to the youngest family member but not less than age 30 or age 40 ( whichever comes first). Annual breast MRI to begin 10 years prior to the youngest family member diagnosed but not less than 25 years old or age 40 ( whichever comes first).  Counseling was provided with available strategies including lifestyle modifications and risk reducing intervention.   - Screening mammogram with clinic visit due: 10/27/23  - She would like to further space out her breast MRI's. Will do a breast MRI 4/2024.   - Follow up with PCP for enlarge cervical lymph node.      2) Lifestyle Modifications were provided.   - Maintain your best healthy weight. Higher body fat and adult weight gain is associated with increased risk for breast cancer. This increase in risk has been attributed to increase in circulating endogenous estrogen levels from fat tissue.   - Alcohol can raise estrogen. Alcohol consumption, even at moderate levels (1-2 drinks per day), increases breast cancer risk and are best avoided. If you choose to drink alcohol limit alcohol consumption to less than 1 drink per day. (1 ounce of liquor, 6 ounces of wine, or 8 ounces of beer).  - Be active daily and void being sedentary.     Tasia Mcdaniel PA-C    20 minutes spent on the date of the encounter doing chart review, review of test results, interpretation of tests, patient visit and documentation.

## 2022-10-26 ENCOUNTER — ANCILLARY PROCEDURE (OUTPATIENT)
Dept: MAMMOGRAPHY | Facility: CLINIC | Age: 52
End: 2022-10-26
Attending: PHYSICIAN ASSISTANT
Payer: COMMERCIAL

## 2022-10-26 ENCOUNTER — OFFICE VISIT (OUTPATIENT)
Dept: SURGERY | Facility: CLINIC | Age: 52
End: 2022-10-26
Attending: PHYSICIAN ASSISTANT
Payer: COMMERCIAL

## 2022-10-26 VITALS
WEIGHT: 155.2 LBS | BODY MASS INDEX: 22.75 KG/M2 | TEMPERATURE: 98.1 F | HEART RATE: 63 BPM | DIASTOLIC BLOOD PRESSURE: 75 MMHG | SYSTOLIC BLOOD PRESSURE: 116 MMHG | OXYGEN SATURATION: 99 %

## 2022-10-26 DIAGNOSIS — Z91.89 AT HIGH RISK FOR BREAST CANCER: ICD-10-CM

## 2022-10-26 DIAGNOSIS — Z80.3 FAMILY HISTORY OF MALIGNANT NEOPLASM OF BREAST: Primary | ICD-10-CM

## 2022-10-26 DIAGNOSIS — Z12.31 VISIT FOR SCREENING MAMMOGRAM: ICD-10-CM

## 2022-10-26 DIAGNOSIS — Z12.39 BREAST CANCER SCREENING, HIGH RISK PATIENT: ICD-10-CM

## 2022-10-26 PROCEDURE — 77067 SCR MAMMO BI INCL CAD: CPT | Performed by: RADIOLOGY

## 2022-10-26 PROCEDURE — G0463 HOSPITAL OUTPT CLINIC VISIT: HCPCS

## 2022-10-26 PROCEDURE — 99213 OFFICE O/P EST LOW 20 MIN: CPT | Performed by: PHYSICIAN ASSISTANT

## 2022-10-26 PROCEDURE — 77063 BREAST TOMOSYNTHESIS BI: CPT | Performed by: RADIOLOGY

## 2022-10-26 ASSESSMENT — PAIN SCALES - GENERAL: PAINLEVEL: MILD PAIN (3)

## 2022-10-26 NOTE — PATIENT INSTRUCTIONS
Gemma Garvey is a 51 year old woman with family history of breast cancer. She meets NCCN guidelines for high risk screening and risk reduction. Risk reduction intervention previously discussed.     1) Family history of breast cancer  Discussed she meets NCCN guidelines for high risk screening based on family history with lifetime risk for breast cancer of >20%. Screening recommendations based on NCCN guidelines. Be familiar with your breast and promptly report any changes to your health care provider. Clinical encounter every 6-12 months starting now (but not prior to age 21). Annual mammogram with thompson starting 10 years prior to the youngest family member but not less than age 30 or age 40 ( whichever comes first). Annual breast MRI to begin 10 years prior to the youngest family member diagnosed but not less than 25 years old or age 40 ( whichever comes first).  Counseling was provided with available strategies including lifestyle modifications and risk reducing intervention.   - Screening mammogram with clinic visit due: 10/27/23  - She would like to further space out her breast MRI's. Will do a breast MRI 4/2024.   - Follow up with PCP for enlarge cervical lymph node.      2) Lifestyle Modifications were provided.   - Maintain your best healthy weight. Higher body fat and adult weight gain is associated with increased risk for breast cancer. This increase in risk has been attributed to increase in circulating endogenous estrogen levels from fat tissue.   - Alcohol can raise estrogen. Alcohol consumption, even at moderate levels (1-2 drinks per day), increases breast cancer risk and are best avoided. If you choose to drink alcohol limit alcohol consumption to less than 1 drink per day. (1 ounce of liquor, 6 ounces of wine, or 8 ounces of beer).  - Be active daily and void being sedentary.

## 2022-10-26 NOTE — LETTER
10/26/2022         RE: Gemma Garvey  [address]  [facility] [address]        Dear Colleague,    Thank you for referring your patient, Gemma Garvey, to the [facility]. Please see a copy of my visit note below.    FOLLOW UP  Oct 26, 2022     Gemma Garvey is a 51 year old woman who presents with a family history of breast cancer.      HPI:     Family history of 2 sisters with breast cancer at age 46 and 49. She had negative genetic testing .      She denies any breast concerns today including mass skin change, nipple inversion or nipple discharge. Her last breast MRI was in . She is due for a screening mammogram today.      BREAST-SPECIFIC HISTORY:     Previous breast imaging: Yes  - 3/1/13  - 3/12/13  - 3/13/14  - 4/30/15  - 3/25/16  - 21 breast MRI negative per patient.   - 10/12/2021 Smammo BI-RADS 1  - 22 breast MRI BI-RADS 1     Prior breast biopsies/surgeries: No     Prior history of breast cancer or DCIS: No  Prior radiation history: No  Self breast exams: No  Breast density: heterogeneously dense     GYN HISTORY:  . Age at 1st pregnancy: 29. Breastfeeding history: Yes.   Age at menarche: 11  Menopausal: unsure but no longer gets period  Menopausal hormone replacement therapy: No      RISK ASSESSMENT: > 3% 5 year risk and > 20% lifetime risk   Tricia: 3.2% 5 year risk   [place]: 34.0% 10 year risk   [place]ung: 20.9% lifetime risk     FAMILY HISTORY:  Breast ca: Yes   - sister, 46   - sister, 49 negative genetic testing .  - paternal aunt 40's   Ovarian ca: No  Pancreatic ca: No  Prostate: No  Gastric ca: No  Melanoma: No   Colon ca: No  Other cancer: No  Other genetic, testing, syndromes, or clotting disorders: no     PAST MEDICAL HISTORY  Past Medical History:   Diagnosis Date     Anemia, unspecified '    Anemia- now resolved     Depressive disorder, not elsewhere classified '    Depression  (non-psychotic)-resolved with Paxil     PAST SURGICAL HISTORY   Past Surgical History:   Procedure Laterality Date     HERNIA REPAIR  2013     MEDICATIONS  Current Outpatient Medications   Medication Sig Dispense Refill     cyanocobalamin (VITAMIN B-12) 100 MCG tablet Take 100 mcg by mouth daily       ferrous sulfate (FEROSUL) 325 (65 Fe) MG tablet Take 325 mg by mouth daily (with breakfast)       fish oil-omega-3 fatty acids 1000 MG capsule Take 2 g by mouth daily       Vitamin D3 (CHOLECALCIFEROL) 25 mcg (1000 units) tablet Take by mouth daily       LORazepam (ATIVAN) 0.5 MG tablet Take 0.5 mg by mouth every 6 hours as needed for anxiety (Patient not taking: Reported on 10/26/2022)       ALLERGIES  Allergies   Allergen Reactions     Cats      No Known Drug Allergies       SOCIAL HISTORY:  Smokes: No  EtOH: once weekly   Exercise: active daily   Moved here from [place]. She works in a senior living facility.     ROS:  Change in vision No  Headaches: no  Respiratory: No shortness of breath, dyspnea on exertion, cough, or hemoptysis   Cardiovascular: negative   Gastrointestinal: negative Abdominal pain: no  Breast: negative  Musculoskeletal: negative Joint pain No Back pain: no  Psychiatric: negative  Hematologic/Lymphatic/Immunologic: negative  Endocrine: negative    EXAM  /75   Pulse 63   Temp 98.1  F (36.7  C) (Oral)   Wt 70.4 kg (155 lb 3.2 oz)   LMP 11/26/2011   SpO2 99%   BMI 22.75 kg/m     PHYSICAL EXAM  Respiratory: breathing non labored.   Breasts: Examination was done in both the upright and supine positions.  Breasts are symmetrical . No dominant fixed or suspicious masses noted. No skin or nipple changes. No nipple discharge.   No clavicular or axillary lymphadenopathy. Left anterior 3x2 cm cervical lymph, smooth and mobile.     INVESTIGATIONS:    10/26/22 screening mammogram: per Radiology no concerning findings, final report pending.     ASSESSMENT/PLAN:    Gemma Garvey is a 51 year  old woman with family history of breast cancer. She meets NCCN guidelines for high risk screening and risk reduction. Risk reduction intervention previously discussed.     1) Family history of breast cancer  Discussed she meets NCCN guidelines for high risk screening based on family history with lifetime risk for breast cancer of >20%. Screening recommendations based on NCCN guidelines. Be familiar with your breast and promptly report any changes to your health care provider. Clinical encounter every 6-12 months starting now (but not prior to age 21). Annual mammogram with thompson starting 10 years prior to the youngest family member but not less than age 30 or age 40 ( whichever comes first). Annual breast MRI to begin 10 years prior to the youngest family member diagnosed but not less than 25 years old or age 40 ( whichever comes first).  Counseling was provided with available strategies including lifestyle modifications and risk reducing intervention.   - Screening mammogram with clinic visit due: 10/27/23  - She would like to further space out her breast MRI's. Will do a breast MRI 4/2024.   - Follow up with PCP for enlarge cervical lymph node.      2) Lifestyle Modifications were provided.   - Maintain your best healthy weight. Higher body fat and adult weight gain is associated with increased risk for breast cancer. This increase in risk has been attributed to increase in circulating endogenous estrogen levels from fat tissue.   - Alcohol can raise estrogen. Alcohol consumption, even at moderate levels (1-2 drinks per day), increases breast cancer risk and are best avoided. If you choose to drink alcohol limit alcohol consumption to less than 1 drink per day. (1 ounce of liquor, 6 ounces of wine, or 8 ounces of beer).  - Be active daily and void being sedentary.       20 minutes spent on the date of the encounter doing chart review, review of test results, interpretation of tests, patient visit and documentation.          Again, thank you for allowing me to participate in the care of your patient.      Sincerely,    Tasia Mcdaniel PA-C

## 2022-10-26 NOTE — NURSING NOTE
"Oncology Rooming Note    October 26, 2022 9:35 AM   Gemma Garvey is a 51 year old female who presents for:    Chief Complaint   Patient presents with     Oncology Clinic Visit     Family history of malignant neoplasm of breast     Initial Vitals: /75   Pulse 63   Temp 98.1  F (36.7  C) (Oral)   Wt 70.4 kg (155 lb 3.2 oz)   LMP 11/26/2011   SpO2 99%   BMI 22.75 kg/m   Estimated body mass index is 22.75 kg/m  as calculated from the following:    Height as of 7/13/22: 1.759 m (5' 9.25\").    Weight as of this encounter: 70.4 kg (155 lb 3.2 oz). Body surface area is 1.85 meters squared.  Mild Pain (3) Comment: Data Unavailable   Patient's last menstrual period was 11/26/2011.  Allergies reviewed: Yes  Medications reviewed: Yes    Medications: Medication refills not needed today.  Pharmacy name entered into [employer]:    [facility] - [place], [place] - [address] [id number] - [place], [place] - [address]     Clinical concerns: none       Ailyn Leonard"

## 2022-11-09 ENCOUNTER — OFFICE VISIT (OUTPATIENT)
Dept: INTERNAL MEDICINE | Facility: CLINIC | Age: 52
End: 2022-11-09
Payer: COMMERCIAL

## 2022-11-09 ENCOUNTER — TELEPHONE (OUTPATIENT)
Dept: INTERNAL MEDICINE | Facility: CLINIC | Age: 52
End: 2022-11-09

## 2022-11-09 VITALS
HEART RATE: 60 BPM | WEIGHT: 155 LBS | BODY MASS INDEX: 22.72 KG/M2 | OXYGEN SATURATION: 99 % | SYSTOLIC BLOOD PRESSURE: 121 MMHG | DIASTOLIC BLOOD PRESSURE: 78 MMHG

## 2022-11-09 DIAGNOSIS — E01.0 THYROMEGALY: ICD-10-CM

## 2022-11-09 DIAGNOSIS — D72.810 LYMPHOPENIA: ICD-10-CM

## 2022-11-09 DIAGNOSIS — M25.552 HIP PAIN, LEFT: Primary | ICD-10-CM

## 2022-11-09 PROCEDURE — 99396 PREV VISIT EST AGE 40-64: CPT | Performed by: INTERNAL MEDICINE

## 2022-11-09 ASSESSMENT — ENCOUNTER SYMPTOMS
JOINT SWELLING: 0
MYALGIAS: 0
ARTHRALGIAS: 1
STIFFNESS: 0
MUSCLE CRAMPS: 0
MUSCLE WEAKNESS: 0
BACK PAIN: 1
NECK PAIN: 0

## 2022-11-09 NOTE — NURSING NOTE
Gemma Garvey is a 51 year old female that presents in clinic today for the following:     Chief Complaint   Patient presents with     Physical     Pt here for annual physical, wants to discuss getting a colonoscopy, pain around hip and rear areas trying PT to help       The patient's allergies and medications were reviewed. The patient's vitals were obtained without incident. The patient does not have any other questions for the provider.     Andrea Roman, EMT at 2:08 PM on 11/9/2022.  Primary Care Clinic: [phone]

## 2022-11-09 NOTE — PROGRESS NOTES
Gemma is a very pleasant 51 year old female who is here to establish care with me and obtain a physical.    She feels well in general but is being followed closely for family history of breast cancer.  At this point, she is doing an annual 3D mammogram alternating with an annual MRI for screening.    Gemma endorses some left sided hip/low back pain, it starts near the top of her posterior superior iliac spine and then sometimes will radiate around to the left hip area.  There has been no imaging as of yet.  I suggested plain films of both the left hip and the lumbar spine first.  Ortho referral was also offered.    Gemma points out that her white blood count is always low.  She was also told she had enlarged lymph nodes in the neck, but feels well, has no systemic symptoms and no symptoms related to thyroid function.    No changes to past, family or social history and  ROS: 10 point ROS neg other than the symptoms noted above in the HPI.    PHYSICAL EXAM:  /78 (BP Location: Right arm, Patient Position: Sitting, Cuff Size: Adult Regular)   Pulse 60   Wt 70.3 kg (155 lb)   LMP 11/26/2011   SpO2 99%   BMI 22.72 kg/m    Constitutional: no distress, comfortable, pleasant   Eyes: anicteric, normal extra-ocular movements   Ears, Nose and Throat: tympanic membranes clear, moderate amount of cerumen in the EAC, neck supple with full range of motion, there is 1+ thyromegaly with no nodules palpated; the area she points out as the enlarged lymph node corresponds with the left superior lobe of the thyroid gland, and it moves up and down with swallowing.   Cardiovascular: regular rate and rhythm, normal S1 and S2, no murmurs, rubs or gallops, peripheral pulses full and symmetric   Respiratory: clear to auscultation, no wheezes or crackles, normal breath sounds   Gastrointestinal: positive bowel sounds, nontender, no hepatosplenomegaly, no masses   Musculoskeletal: knees full range of motion, no effusion.  Left hip area:  tenderness over iliac spine is noted, no tenderness to palpation of the SI joint, and the greater trochanteric bursa is also nontender  Skin: no concerning lesions, no jaundice   Neurological: normal gait, no tremor   Psychological: appropriate mood   Lymphatic: no cervical lymphadenopathy and no pedal edema    A/P Gemma was seen today for physical.      Hip pain, left  -     Orthopedic  Referral; Future  -     XR Hip Left 2-3 Views; Future  -     XR Lumbar Spine 2-3 Views; Future    Thyromegaly vs thyroid nodule on exam  -     TSH with free T4 reflex; Future  -     US Head Neck Soft Tissue; Future    Lymphopenia  -     US Head Neck Soft Tissue; Future, asked radiology to also evaluate lymph nodes in the neck given prior history of low WBC and possibly lymph node enlargement    RTC in one year, will need repeat colonoscopy next year.  Pap due in 2026.      Yola Edwards MD

## 2022-11-13 ENCOUNTER — MYC MEDICAL ADVICE (OUTPATIENT)
Dept: INTERNAL MEDICINE | Facility: CLINIC | Age: 52
End: 2022-11-13

## 2022-11-13 DIAGNOSIS — E01.0 THYROMEGALY: ICD-10-CM

## 2022-11-13 DIAGNOSIS — Z00.00 HEALTHCARE MAINTENANCE: ICD-10-CM

## 2022-11-13 DIAGNOSIS — D72.810 LYMPHOPENIA: Primary | ICD-10-CM

## 2022-11-13 DIAGNOSIS — Z13.220 SCREENING FOR LIPID DISORDERS: ICD-10-CM

## 2022-11-17 ENCOUNTER — HOSPITAL ENCOUNTER (OUTPATIENT)
Dept: GENERAL RADIOLOGY | Facility: CLINIC | Age: 52
Discharge: HOME | End: 2022-11-17
Attending: INTERNAL MEDICINE
Payer: COMMERCIAL

## 2022-11-17 ENCOUNTER — HOSPITAL ENCOUNTER (OUTPATIENT)
Dept: ULTRASOUND IMAGING | Facility: CLINIC | Age: 52
Discharge: HOME | End: 2022-11-17
Attending: INTERNAL MEDICINE
Payer: COMMERCIAL

## 2022-11-17 DIAGNOSIS — E01.0 THYROMEGALY: ICD-10-CM

## 2022-11-17 DIAGNOSIS — M25.552 HIP PAIN, LEFT: ICD-10-CM

## 2022-11-17 DIAGNOSIS — D72.810 LYMPHOPENIA: ICD-10-CM

## 2022-11-17 PROCEDURE — 73502 X-RAY EXAM HIP UNI 2-3 VIEWS: CPT

## 2022-11-17 PROCEDURE — 76536 US EXAM OF HEAD AND NECK: CPT

## 2022-11-17 PROCEDURE — 72100 X-RAY EXAM L-S SPINE 2/3 VWS: CPT

## 2022-12-12 ENCOUNTER — MYC MEDICAL ADVICE (OUTPATIENT)
Dept: INTERNAL MEDICINE | Facility: CLINIC | Age: 52
End: 2022-12-12

## 2022-12-12 DIAGNOSIS — M54.50 LEFT-SIDED LOW BACK PAIN WITHOUT SCIATICA: Primary | ICD-10-CM

## 2022-12-21 ENCOUNTER — MYC MEDICAL ADVICE (OUTPATIENT)
Dept: INTERNAL MEDICINE | Facility: CLINIC | Age: 52
End: 2022-12-21

## 2022-12-21 ENCOUNTER — MYC REFILL (OUTPATIENT)
Dept: INTERNAL MEDICINE | Facility: CLINIC | Age: 52
End: 2022-12-21

## 2022-12-21 DIAGNOSIS — F41.9 ANXIETY: Primary | ICD-10-CM

## 2022-12-21 DIAGNOSIS — M54.50 LEFT-SIDED LOW BACK PAIN WITHOUT SCIATICA: Primary | ICD-10-CM

## 2022-12-22 ENCOUNTER — LAB (OUTPATIENT)
Dept: LAB | Facility: CLINIC | Age: 52
End: 2022-12-22
Payer: COMMERCIAL

## 2022-12-22 DIAGNOSIS — Z00.00 HEALTHCARE MAINTENANCE: ICD-10-CM

## 2022-12-22 DIAGNOSIS — D72.810 LYMPHOPENIA: ICD-10-CM

## 2022-12-22 DIAGNOSIS — Z13.220 SCREENING FOR LIPID DISORDERS: ICD-10-CM

## 2022-12-22 DIAGNOSIS — E01.0 THYROMEGALY: ICD-10-CM

## 2022-12-22 LAB
ANION GAP SERPL CALCULATED.3IONS-SCNC: 10 MMOL/L (ref 7–15)
BASOPHILS # BLD AUTO: 0 10E3/UL (ref 0–0.2)
BASOPHILS NFR BLD AUTO: 1 %
BUN SERPL-MCNC: 14.5 MG/DL (ref 6–20)
CALCIUM SERPL-MCNC: 9.8 MG/DL (ref 8.6–10)
CHLORIDE SERPL-SCNC: 103 MMOL/L (ref 98–107)
CHOLEST SERPL-MCNC: 194 MG/DL
CREAT SERPL-MCNC: 0.85 MG/DL (ref 0.51–0.95)
DEPRECATED HCO3 PLAS-SCNC: 26 MMOL/L (ref 22–29)
EOSINOPHIL # BLD AUTO: 0.1 10E3/UL (ref 0–0.7)
EOSINOPHIL NFR BLD AUTO: 2 %
ERYTHROCYTE [DISTWIDTH] IN BLOOD BY AUTOMATED COUNT: 13.2 % (ref 10–15)
GFR SERPL CREATININE-BSD FRML MDRD: 81 ML/MIN/1.73M2
GLUCOSE SERPL-MCNC: 93 MG/DL (ref 70–99)
HCT VFR BLD AUTO: 39.6 % (ref 35–47)
HDLC SERPL-MCNC: 100 MG/DL
HGB BLD-MCNC: 12.7 G/DL (ref 11.7–15.7)
IMM GRANULOCYTES # BLD: 0 10E3/UL
IMM GRANULOCYTES NFR BLD: 0 %
LDLC SERPL CALC-MCNC: 86 MG/DL
LYMPHOCYTES # BLD AUTO: 1.2 10E3/UL (ref 0.8–5.3)
LYMPHOCYTES NFR BLD AUTO: 33 %
MCH RBC QN AUTO: 31.2 PG (ref 26.5–33)
MCHC RBC AUTO-ENTMCNC: 32.1 G/DL (ref 31.5–36.5)
MCV RBC AUTO: 97 FL (ref 78–100)
MONOCYTES # BLD AUTO: 0.2 10E3/UL (ref 0–1.3)
MONOCYTES NFR BLD AUTO: 7 %
NEUTROPHILS # BLD AUTO: 2 10E3/UL (ref 1.6–8.3)
NEUTROPHILS NFR BLD AUTO: 57 %
NONHDLC SERPL-MCNC: 94 MG/DL
PLATELET # BLD AUTO: 193 10E3/UL (ref 150–450)
POTASSIUM SERPL-SCNC: 4.6 MMOL/L (ref 3.4–5.3)
RBC # BLD AUTO: 4.07 10E6/UL (ref 3.8–5.2)
SODIUM SERPL-SCNC: 139 MMOL/L (ref 136–145)
TRIGL SERPL-MCNC: 38 MG/DL
WBC # BLD AUTO: 3.5 10E3/UL (ref 4–11)

## 2022-12-22 PROCEDURE — 85025 COMPLETE CBC W/AUTO DIFF WBC: CPT

## 2022-12-22 PROCEDURE — 80048 BASIC METABOLIC PNL TOTAL CA: CPT

## 2022-12-22 PROCEDURE — 80061 LIPID PANEL: CPT

## 2022-12-22 PROCEDURE — 36415 COLL VENOUS BLD VENIPUNCTURE: CPT

## 2022-12-22 RX ORDER — LORAZEPAM 0.5 MG/1
0.5 TABLET ORAL EVERY 6 HOURS PRN
Qty: 10 TABLET | Refills: 3 | Status: SHIPPED | OUTPATIENT
Start: 2022-12-22

## 2022-12-29 NOTE — TELEPHONE ENCOUNTER
PT order faxed to [phone].     Alvino Wren, EMT at 9:01 AM on 12/29/2022.  Primary Care Clinic: [phone]

## 2023-01-05 ENCOUNTER — MYC MEDICAL ADVICE (OUTPATIENT)
Dept: INTERNAL MEDICINE | Facility: CLINIC | Age: 53
End: 2023-01-05

## 2023-01-16 ENCOUNTER — MYC MEDICAL ADVICE (OUTPATIENT)
Dept: INTERNAL MEDICINE | Facility: CLINIC | Age: 53
End: 2023-01-16

## 2023-01-20 ENCOUNTER — DOCUMENTATION ONLY (OUTPATIENT)
Dept: INTERNAL MEDICINE | Facility: CLINIC | Age: 53
End: 2023-01-20
Payer: COMMERCIAL

## 2023-01-20 NOTE — PROGRESS NOTES
Type of Form Received: PROVIDER RECOMMENDATION FORM    Form Received (Date) 1/15/23   Form Filled out Yes, date 1/20/23   Placed in provider folder Yes

## 2023-02-07 ASSESSMENT — ENCOUNTER SYMPTOMS
ARTHRALGIAS: 1
MUSCLE WEAKNESS: 0
JOINT SWELLING: 0
MUSCLE CRAMPS: 0
MYALGIAS: 1
STIFFNESS: 0
NECK PAIN: 0
BACK PAIN: 0

## 2023-02-07 ASSESSMENT — ANXIETY QUESTIONNAIRES
6. BECOMING EASILY ANNOYED OR IRRITABLE: NOT AT ALL
1. FEELING NERVOUS, ANXIOUS, OR ON EDGE: NOT AT ALL
IF YOU CHECKED OFF ANY PROBLEMS ON THIS QUESTIONNAIRE, HOW DIFFICULT HAVE THESE PROBLEMS MADE IT FOR YOU TO DO YOUR WORK, TAKE CARE OF THINGS AT HOME, OR GET ALONG WITH OTHER PEOPLE: NOT DIFFICULT AT ALL
4. TROUBLE RELAXING: NOT AT ALL
GAD7 TOTAL SCORE: 0
GAD7 TOTAL SCORE: 0
7. FEELING AFRAID AS IF SOMETHING AWFUL MIGHT HAPPEN: NOT AT ALL
2. NOT BEING ABLE TO STOP OR CONTROL WORRYING: NOT AT ALL
7. FEELING AFRAID AS IF SOMETHING AWFUL MIGHT HAPPEN: NOT AT ALL
8. IF YOU CHECKED OFF ANY PROBLEMS, HOW DIFFICULT HAVE THESE MADE IT FOR YOU TO DO YOUR WORK, TAKE CARE OF THINGS AT HOME, OR GET ALONG WITH OTHER PEOPLE?: NOT DIFFICULT AT ALL
5. BEING SO RESTLESS THAT IT IS HARD TO SIT STILL: NOT AT ALL
3. WORRYING TOO MUCH ABOUT DIFFERENT THINGS: NOT AT ALL

## 2023-02-08 ENCOUNTER — OFFICE VISIT (OUTPATIENT)
Dept: OBGYN | Facility: CLINIC | Age: 53
End: 2023-02-08
Attending: OBSTETRICS & GYNECOLOGY
Payer: COMMERCIAL

## 2023-02-08 VITALS
DIASTOLIC BLOOD PRESSURE: 71 MMHG | BODY MASS INDEX: 22.35 KG/M2 | HEART RATE: 60 BPM | HEIGHT: 69 IN | WEIGHT: 150.9 LBS | SYSTOLIC BLOOD PRESSURE: 108 MMHG

## 2023-02-08 DIAGNOSIS — Z12.11 ENCOUNTER FOR SCREENING COLONOSCOPY: Primary | ICD-10-CM

## 2023-02-08 PROCEDURE — G0463 HOSPITAL OUTPT CLINIC VISIT: HCPCS | Performed by: OBSTETRICS & GYNECOLOGY

## 2023-02-08 PROCEDURE — 99386 PREV VISIT NEW AGE 40-64: CPT | Mod: GC | Performed by: OBSTETRICS & GYNECOLOGY

## 2023-02-08 ASSESSMENT — PATIENT HEALTH QUESTIONNAIRE - PHQ9: 5. POOR APPETITE OR OVEREATING: NOT AT ALL

## 2023-02-08 ASSESSMENT — ANXIETY QUESTIONNAIRES
1. FEELING NERVOUS, ANXIOUS, OR ON EDGE: SEVERAL DAYS
5. BEING SO RESTLESS THAT IT IS HARD TO SIT STILL: NOT AT ALL
6. BECOMING EASILY ANNOYED OR IRRITABLE: SEVERAL DAYS
GAD7 TOTAL SCORE: 2
3. WORRYING TOO MUCH ABOUT DIFFERENT THINGS: NOT AT ALL
2. NOT BEING ABLE TO STOP OR CONTROL WORRYING: NOT AT ALL
7. FEELING AFRAID AS IF SOMETHING AWFUL MIGHT HAPPEN: NOT AT ALL

## 2023-02-08 NOTE — PROGRESS NOTES
[facility]  Gynecology Visit    SUBJECTIVE    Gemma Garvey is a 52 year old  who is here for an annual exam.     Pt has done pelvic PT with improvement of urge incontinence sx previously; no current symptoms. No vaginal bleeding since menopause. No vaginal dryness, hot flashes, mood sx or appetite changes. Pap last NILM, HPV neg in 2021. Reports no hx abnormal pap. No breast concerns today. Does have family history of breast cancer in two sisters in their 40s. Follows for breast mammogram and MRI yearly, last in 10/22 and wnl. She is sexually active with 1 male partner () and denies dyspareunia or other concerns around sexual health.      Her LMP is: Patient's last menstrual period was 2011.    PAST MEDICAL HISTORY  Past Medical History:   Diagnosis Date     Anemia, unspecified '    Anemia- now resolved     Depressive disorder, not elsewhere classified '    Depression (non-psychotic)-resolved with Paxil       MEDICATIONS  Current Outpatient Medications   Medication     cyanocobalamin (VITAMIN B-12) 100 MCG tablet     ferrous sulfate (FEROSUL) 325 (65 Fe) MG tablet     fish oil-omega-3 fatty acids 1000 MG capsule     LORazepam (ATIVAN) 0.5 MG tablet     Vitamin D3 (CHOLECALCIFEROL) 25 mcg (1000 units) tablet     No current facility-administered medications for this visit.       ALLERGIES  Allergies   Allergen Reactions     Cats      No Known Drug Allergies        OBSTETRIC/GYNECOLOGIC HISTORY  LNG-IUD until 2020, was amenorrheic with it for 7 years, has had no bleeding since removal  Denies flashes/night sweats/mood changes/sleep disturbances  Pap NILM, HPV neg 2021   Lab Results   Component Value Date    PAP NIL 2007      History of abnormal Pap smear: No    OB History    Para Term  AB Living   3 2 2 0 1 2   SAB IAB Ectopic Multiple Live Births   1 0 0 0 2      # Outcome Date GA Lbr Stef/2nd Weight Sex Delivery Anes PTL Lv   3 Term 03  "40w0d  4.054 kg (8 lb 15 oz) M    NITIN      Name: Joey Hi Term 00 40w0d  4.026 kg (8 lb 14 oz) F    NITIN      Name: Iris Melchor SAB                PAST SURGICAL HISTORY   Past Surgical History:   Procedure Laterality Date     HERNIA REPAIR         SOCIAL HISTORY    Social History     Tobacco Use     Smoking status: Never     Smokeless tobacco: Never   Substance Use Topics     Alcohol use: Yes     Comment: 3 times per month     Drug use: No     Social History     Social History Narrative    [place] back [place] in     Son at [employer] for school, daughter in [place]    Works as            FAMILY HISTORY    Family History   Problem Relation Age of Onset     Hypertension Father          from pulmonary embolism     Circulatory Father         PE     Hypertension Mother      Cerebrovascular Disease Maternal Grandmother      Hypertension Sister      Neurologic Disorder Sister         migraines     Breast Cancer Paternal Aunt        REVIEW OF SYSTEMS  A 10 point review of systems including Constitutional, Eyes, Respiratory, Cardiovascular, Gastroenterology, Genitourinary, Integumentary, Musculoskeletal, and Psychiatric, were all negative, except for pertinent positives noted in the above HPI.    OBJECTIVE  /71 (BP Location: Right arm, Patient Position: Chair)   Pulse 60   Ht 1.753 m (5' 9\")   Wt 68.4 kg (150 lb 14.4 oz)   LMP 2011   BMI 22.28 kg/m      General: Alert, without distress  HEENT: normocephalic, without obvious abnormality   Breast: Within normal limits bilaterally, no nipple discharge, no lymphadenopathy  Cardiovascular: regular rate, well perfused    Lungs: breathing comfortably on room air    Abdomen: soft, non-tender, non-distended, normal bowel sounds   Pelvic: normal external female genitalia; lesions/masses; uterus 6 week size, anteverted, mobile, nontender; adnexae nontender and without masses; normal " anus/perineum   Extremities: normal    IMAGING:  Mammo 10/22  IMPRESSION: BI-RADS CATEGORY: 1 -  Negative.    Colonoscopy 3/2019 - removal of 2 sessile serrated polyps, recommend repeat 5 years (due 2024)    ASSESSMENT  Gemma Garvey is a 52 year old  who is here for an annual exam. No specific concerns, things are going well. Up to date on pap, mammogram, desires referral for colonoscopy follow up today (last  with sessile serrated polyps) which was placed today. Other screening tests wnl; diabetes screening offered today and declined. Pt wondering if she can space out visits with us; discussed that since she sees a PCP regularly and is well connected with breast team in the [facility] does not need to see us yearly unless specific gynecologic concerns. Normal breast and bimanual exam today.     PLAN    Age 40-64 Annual Preventive Exam  1.  Screening   Cervical cancer - last NILM, hpv neg in , no hx abnormal, follow up    CBE and Mammography yearly   Colorectal cancer - pt with hx sessile serrated polyps removed , recommended 5 year follow up at that time, pt would like to repeat a bit sooner, order placed today    Diabetes - screening offered today,  pt declines    Lipids -  Checked  within normal limits    Thyroid - recent US wnl    Lung cancer - no family hx, no personal hx tobacco use      2.  Immunizations   Tdap q 10 years   Herpes zoster once starting at 60 years   Recommended influenza yearly    Patient staffed and seen with Dr. Juancarlos Umana MD  OB/GYN PGY-1  2023 10:33 AM     Patient was seen by the resident in [facility].  I reviewed the history & was present for the patient's exam.  The patient's assessment and plan were made jointly.    Danielle Bauer MD MPH      Answers for HPI/ROS submitted by the patient on 2023  OG 7 TOTAL SCORE: 0  General Symptoms: No  Skin Symptoms: No  HENT Symptoms: No  EYE SYMPTOMS: No  HEART SYMPTOMS: No  LUNG SYMPTOMS:  No  INTESTINAL SYMPTOMS: No  URINARY SYMPTOMS: No  GYNECOLOGIC SYMPTOMS: No  BREAST SYMPTOMS: No  SKELETAL SYMPTOMS: Yes  BLOOD SYMPTOMS: No  NERVOUS SYSTEM SYMPTOMS: No  MENTAL HEALTH SYMPTOMS: No  Back pain: No  Muscle aches: Yes  Neck pain: No  Swollen joints: No  Joint pain: Yes  Bone pain: No  Muscle cramps: No  Muscle weakness: No  Joint stiffness: No  Bone fracture: No

## 2023-02-08 NOTE — PATIENT INSTRUCTIONS
Thank you for trusting us with your care!     If you need to contact us for questions about:  Symptoms, Scheduling & Medical Questions; Non-urgent (2-3 day response) Dragan message, Urgent (needing response today) [phone] (if after 3:30pm next day response)   Prescriptions: Please call your Pharmacy   Billing: Danae [phone] or [facility]:[phone]

## 2023-02-08 NOTE — LETTER
2023       RE: Gemma Garvey  [address]  [facility] [address]     Dear Colleague,    Thank you for referring your patient, Gemma Garvey, to the [facility] [place] [facility]. Please see a copy of my visit note below.    [facility]  Gynecology Visit    SUBJECTIVE    Gemma Garvey is a 52 year old  who is here for an annual exam.     Pt has done pelvic PT with improvement of urge incontinence sx previously; no current symptoms. No vaginal bleeding since menopause. No vaginal dryness, hot flashes, mood sx or appetite changes. Pap last NILM, HPV neg in 2021. Reports no hx abnormal pap. No breast concerns today. Does have family history of breast cancer in two sisters in their 40s. Follows for breast mammogram and MRI yearly, last in 10/22 and wnl. She is sexually active with 1 male partner () and denies dyspareunia or other concerns around sexual health.      Her LMP is: Patient's last menstrual period was 2011.    PAST MEDICAL HISTORY  Past Medical History:   Diagnosis Date     Anemia, unspecified '93    Anemia- now resolved     Depressive disorder, not elsewhere classified '98    Depression (non-psychotic)-resolved with Paxil       MEDICATIONS  Current Outpatient Medications   Medication     cyanocobalamin (VITAMIN B-12) 100 MCG tablet     ferrous sulfate (FEROSUL) 325 (65 Fe) MG tablet     fish oil-omega-3 fatty acids 1000 MG capsule     LORazepam (ATIVAN) 0.5 MG tablet     Vitamin D3 (CHOLECALCIFEROL) 25 mcg (1000 units) tablet     No current facility-administered medications for this visit.       ALLERGIES  Allergies   Allergen Reactions     Cats      No Known Drug Allergies        OBSTETRIC/GYNECOLOGIC HISTORY  LNG-IUD until 2020, was amenorrheic with it for 7 years, has had no bleeding since removal  Denies flashes/night sweats/mood changes/sleep disturbances  Pap NILM,  "HPV neg 2021   Lab Results   Component Value Date    PAP NIL 2007      History of abnormal Pap smear: No    OB History    Para Term  AB Living   3 2 2 0 1 2   SAB IAB Ectopic Multiple Live Births   1 0 0 0 2      # Outcome Date GA Lbr Stef/2nd Weight Sex Delivery Anes PTL Lv   3 Term 03 40w0d  4.054 kg (8 lb 15 oz) M    NITIN      Name: Joey   2 Term 00 40w0d  4.026 kg (8 lb 14 oz) F    NITIN      Name: Iris Melchor SAB                PAST SURGICAL HISTORY   Past Surgical History:   Procedure Laterality Date     HERNIA REPAIR         SOCIAL HISTORY    Social History     Tobacco Use     Smoking status: Never     Smokeless tobacco: Never   Substance Use Topics     Alcohol use: Yes     Comment: 3 times per month     Drug use: No     Social History     Social History Narrative    [place] now back [place] in     Son at [employer] for school, daughter in [place]    Works as            FAMILY HISTORY    Family History   Problem Relation Age of Onset     Hypertension Father          from pulmonary embolism     Circulatory Father         PE     Hypertension Mother      Cerebrovascular Disease Maternal Grandmother      Hypertension Sister      Neurologic Disorder Sister         migraines     Breast Cancer Paternal Aunt        REVIEW OF SYSTEMS  A 10 point review of systems including Constitutional, Eyes, Respiratory, Cardiovascular, Gastroenterology, Genitourinary, Integumentary, Musculoskeletal, and Psychiatric, were all negative, except for pertinent positives noted in the above HPI.    OBJECTIVE  /71 (BP Location: Right arm, Patient Position: Chair)   Pulse 60   Ht 1.753 m (5' 9\")   Wt 68.4 kg (150 lb 14.4 oz)   LMP 2011   BMI 22.28 kg/m      General: Alert, without distress  HEENT: normocephalic, without obvious abnormality   Breast: Within normal limits bilaterally, no nipple discharge, no lymphadenopathy  Cardiovascular: " regular rate, well perfused    Lungs: breathing comfortably on room air    Abdomen: soft, non-tender, non-distended, normal bowel sounds   Pelvic: normal external female genitalia; lesions/masses; uterus 6 week size, anteverted, mobile, nontender; adnexae nontender and without masses; normal anus/perineum   Extremities: normal    IMAGING:  Mammo 10/22  IMPRESSION: BI-RADS CATEGORY: 1 -  Negative.    Colonoscopy 3/2019 - removal of 2 sessile serrated polyps, recommend repeat 5 years (due 2024)    ASSESSMENT  eGmma Garvey is a 52 year old  who is here for an annual exam. No specific concerns, things are going well. Up to date on pap, mammogram, desires referral for colonoscopy follow up today (last  with sessile serrated polyps) which was placed today. Other screening tests wnl; diabetes screening offered today and declined. Pt wondering if she can space out visits with us; discussed that since she sees a PCP regularly and is well connected with breast team in the [facility] does not need to see us yearly unless specific gynecologic concerns. Normal breast and bimanual exam today.     PLAN    Age 40-64 Annual Preventive Exam  1.  Screening   Cervical cancer - last NILM, hpv neg in , no hx abnormal, follow up    CBE and Mammography yearly   Colorectal cancer - pt with hx sessile serrated polyps removed , recommended 5 year follow up at that time, pt would like to repeat a bit sooner, order placed today    Diabetes - screening offered today,  pt declines    Lipids -  Checked  within normal limits    Thyroid - recent US wnl    Lung cancer - no family hx, no personal hx tobacco use      2.  Immunizations   Tdap q 10 years   Herpes zoster once starting at 58 years   Recommended influenza yearly    Patient staffed and seen with Dr. Juancarlos Uamna MD  OB/GYN PGY-1  2023 10:33 AM     Patient was seen by the resident in [facility].  I reviewed the history & was present for the  patient's exam.  The patient's assessment and plan were made jointly.    Danielle Bauer MD MPH      Answers for HPI/ROS submitted by the patient on 2/7/2023  OG 7 TOTAL SCORE: 0  General Symptoms: No  Skin Symptoms: No  HENT Symptoms: No  EYE SYMPTOMS: No  HEART SYMPTOMS: No  LUNG SYMPTOMS: No  INTESTINAL SYMPTOMS: No  URINARY SYMPTOMS: No  GYNECOLOGIC SYMPTOMS: No  BREAST SYMPTOMS: No  SKELETAL SYMPTOMS: Yes  BLOOD SYMPTOMS: No  NERVOUS SYSTEM SYMPTOMS: No  MENTAL HEALTH SYMPTOMS: No  Back pain: No  Muscle aches: Yes  Neck pain: No  Swollen joints: No  Joint pain: Yes  Bone pain: No  Muscle cramps: No  Muscle weakness: No  Joint stiffness: No  Bone fracture: No

## 2023-03-30 ENCOUNTER — MYC MEDICAL ADVICE (OUTPATIENT)
Dept: OBGYN | Facility: CLINIC | Age: 53
End: 2023-03-30
Payer: COMMERCIAL

## 2023-03-31 DIAGNOSIS — Z12.11 SPECIAL SCREENING FOR MALIGNANT NEOPLASMS, COLON: Primary | ICD-10-CM

## 2023-04-06 NOTE — PROGRESS NOTES
[facility] Dermatology Note  Encounter Date: Apr 7, 2023  Office Visit     Dermatology Problem List:  # NUB, Right perispinal mid back superior, ddx: lentigo vs dysplastic nevus, rule out melanoma  # NUB, Right perispinal mid back inferior, ddx: lentigo vs dysplastic nevus, rule out melanoma  - Shave bx 4/7/2023     # Two cousins with melanoma.  ____________________________________________    Assessment & Plan:    # NUB, Right perispinal mid back superior, ddx: lentigo vs dysplastic nevus, rule out melanoma  # NUB, Right perispinal mid back inferior, ddx: lentigo vs dysplastic nevus, rule out melanoma  - Shave biopsy today, see procedure note below    # Cherry angiomas  - Reassured of benign nature     # Multiple benign nevi  # Solar lentigines  - Monitor for ABCDEs of melanoma   - Continue sun protection - recommend SPF 30 or higher with frequent application   - Return sooner if noticing changing or symptomatic lesions    Procedures Performed:   - Shave biopsy procedure note, location(s): see above. After discussion of benefits and risks including but not limited to bleeding, infection, scar, incomplete removal, recurrence, and non-diagnostic biopsy, written consent and photographs were obtained. The area was cleaned with isopropyl alcohol. 0.5mL of 1% lidocaine with epinephrine was injected to obtain adequate anesthesia of lesion(s). Shave biopsy at site(s) performed. Hemostasis was achieved with aluminium chloride. Petrolatum ointment and a sterile dressing were applied. The patient tolerated the procedure and no complications were noted. The patient was provided with verbal and written post care instructions.     Follow-up: 1 year, sooner if concerns. Or pending path results.    Staff and Scribe:     Scribe Disclosure:  I, SHANNON LI, am serving as a scribe to document services personally performed by Adela Schmidt MD based on data collection and the provider's statements to me.      Provider Disclosure:   The documentation recorded by the scribe accurately reflects the services I personally performed and the decisions made by me.    Adela Schmidt MD    Department of Dermatology  [facility]: Phone: [phone], Fax: [phone]  4/8/2023     ____________________________________________    CC: Skin Check (Gemma is here today for a skin check. )    HPI:  Ms. Gemma Garvey is a(n) 52 year old female who presents today as a return patient for FBSE. Last seen by myself on 9/22/21, at which time the patient had no lesions of concern.    She has had two cousins with melanoma. No first degree relatives with skin cancer. History of using tanning oil and high sun exposure.     Patient is otherwise feeling well, without additional skin concerns.    Labs Reviewed:  N/A    Physical Exam:  Vitals: LMP 11/26/2011   SKIN: Full skin, excluding the groin, which includes the head/face, both arms, chest, back, abdomen,both legs, buttocks, digits and/or nails, was examined.  - Herrera Skin Type I (red hair, blue eyes)  - There are dome shaped bright red papules on the trunk and extremities.   - Multiple regular brown pigmented macules and papules are identified on the trunk and extremities.   - Scattered brown macules on sun exposed areas.  - Right perispinal mid back superior, even brown macule with sightly irregular pigment on dermoscopy.   - Right perispinal mid back inferior, even brown macule with sightly irregular pigment on dermoscopy.   - No other lesions of concern on areas examined.     Medications:  Current Outpatient Medications   Medication     cyanocobalamin (VITAMIN B-12) 100 MCG tablet     ferrous sulfate (FEROSUL) 325 (65 Fe) MG tablet     fish oil-omega-3 fatty acids 1000 MG capsule     Vitamin D3 (CHOLECALCIFEROL) 25 mcg (1000 units) tablet     LORazepam (ATIVAN) 0.5 MG tablet     No  current facility-administered medications for this visit.      Past Medical History:   Patient Active Problem List   Diagnosis     Uterovaginal prolapse, incomplete     Past Medical History:   Diagnosis Date     Anemia, unspecified '93    Anemia- now resolved     Depressive disorder, not elsewhere classified '98    Depression (non-psychotic)-resolved with Paxil        CC Referred Self, MD  No address on file on close of this encounter.

## 2023-04-07 ENCOUNTER — OFFICE VISIT (OUTPATIENT)
Dept: DERMATOLOGY | Facility: CLINIC | Age: 53
End: 2023-04-07
Payer: COMMERCIAL

## 2023-04-07 DIAGNOSIS — D22.9 MULTIPLE BENIGN NEVI: Primary | ICD-10-CM

## 2023-04-07 DIAGNOSIS — D49.2 NEOPLASM OF UNSPECIFIED BEHAVIOR OF BONE, SOFT TISSUE, AND SKIN: ICD-10-CM

## 2023-04-07 DIAGNOSIS — D18.01 CHERRY ANGIOMA: ICD-10-CM

## 2023-04-07 DIAGNOSIS — L81.4 SOLAR LENTIGO: ICD-10-CM

## 2023-04-07 PROCEDURE — 11103 TANGNTL BX SKIN EA SEP/ADDL: CPT | Performed by: DERMATOLOGY

## 2023-04-07 PROCEDURE — 88342 IMHCHEM/IMCYTCHM 1ST ANTB: CPT | Mod: TC | Performed by: DERMATOLOGY

## 2023-04-07 PROCEDURE — 88305 TISSUE EXAM BY PATHOLOGIST: CPT | Mod: 26 | Performed by: PATHOLOGY

## 2023-04-07 PROCEDURE — 11102 TANGNTL BX SKIN SINGLE LES: CPT | Performed by: DERMATOLOGY

## 2023-04-07 PROCEDURE — 88342 IMHCHEM/IMCYTCHM 1ST ANTB: CPT | Mod: 26 | Performed by: PATHOLOGY

## 2023-04-07 PROCEDURE — 99213 OFFICE O/P EST LOW 20 MIN: CPT | Mod: 25 | Performed by: DERMATOLOGY

## 2023-04-07 ASSESSMENT — PAIN SCALES - GENERAL: PAINLEVEL: NO PAIN (0)

## 2023-04-07 NOTE — NURSING NOTE
Dermatology Rooming Note    Gemma Garvey's goals for this visit include:   Chief Complaint   Patient presents with     Skin Check     Gemma is here today for a skin check.      Mariann Aranda RN

## 2023-04-07 NOTE — PATIENT INSTRUCTIONS
Patient Education     Checking for Skin Cancer  You can find cancer early by checking your skin each month. There are 3 kinds of skin cancer. They are melanoma, basal cell carcinoma, and squamous cell carcinoma. Doing monthly skin checks is the best way to find new marks or skin changes. Follow the instructions below for checking your skin.   The ABCDEs of checking moles for melanoma   Check your moles or growths for signs of melanoma using ABCDE:   Asymmetry: the sides of the mole or growth don t match  Border: the edges are ragged, notched, or blurred  Color: the color within the mole or growth varies  Diameter: the mole or growth is larger than 6 mm (size of a pencil eraser)  Evolving: the size, shape, or color of the mole or growth is changing (evolving is not shown in the images below)    Checking for other types of skin cancer  Basal cell carcinoma or squamous cell carcinoma have symptoms such as:     A spot or mole that looks different from all other marks on your skin  Changes in how an area feels, such as itching, tenderness, or pain  Changes in the skin's surface, such as oozing, bleeding, or scaliness  A sore that does not heal  New swelling or redness beyond the border of a mole    Who s at risk?  Anyone can get skin cancer. But you are at greater risk if you have:   Fair skin, light-colored hair, or light-colored eyes  Many moles or abnormal moles on your skin  A history of sunburns from sunlight or tanning beds  A family history of skin cancer  A history of exposure to radiation or chemicals  A weakened immune system  If you have had skin cancer in the past, you are at risk for recurring skin cancer.   How to check your skin  Do your monthly skin checkups in front of a full-length mirror. Check all parts of your body, including your:   Head (ears, face, neck, and scalp)  Torso (front, back, and sides)  Arms (tops, undersides, upper, and lower armpits)  Hands (palms, backs, and fingers, including  under the nails)  Buttocks and genitals  Legs (front, back, and sides)  Feet (tops, soles, toes, including under the nails, and between toes)  If you have a lot of moles, take digital photos of them each month. Make sure to take photos both up close and from a distance. These can help you see if any moles change over time.   Most skin changes are not cancer. But if you see any changes in your skin, call your doctor right away. Only he or she can diagnose a problem. If you have skin cancer, seeing your doctor can be the first step toward getting the treatment that could save your life.   [employer] last reviewed this educational content on 4/1/2019 2000-2020 [employer]. [address] [place] [address], [place] [address]. All rights reserved. This information is not intended as a substitute for professional medical care. Always follow your healthcare professional's instructions.       When should I call my doctor?  If you are worsening or not improving, please, contact us or seek urgent care as noted below.     Who should I call with questions (adults)?  [facility] (adult and pediatric): [phone]  [facility] (adult): [phone]  For urgent needs outside of business hours call the [facility] at [phone] and ask for the dermatology resident on call to be paged  If this is a medical emergency and you are unable to reach an ER, Call [phone]    Who should I call with questions (pediatric)?  [facility]- Pediatric Dermatology  Dr. Enriqueta Leonard, Dr. Laron Weems, Dr. Ariana Thompson, NEEMA Montes, Dr. Hien Etienne, Dr. Shannan Pierre & Dr. Reese Piper  Non-urgent nurse triage line; [phone]- Chyna and Torrie HANNA Care Coordinatormonique Sandoval (/Complex ) [phone]    If you need a prescription refill, please contact your pharmacy. Refills are approved or denied by our  Physicians during normal business hours, Monday through Fridays  Per office policy, refills will not be granted if you have not been seen within the past year (or sooner depending on your child's condition)    Scheduling Information:  Pediatric Appointment Scheduling and Call Center [phone]  Radiology Scheduling- [phone]  Sedation Unit Scheduling- [phone]  [facility] Scheduling- General [phone]; Pediatric Dermatology [phone]  Main  Services: [phone]  Belarusian: [phone]  Hong Konger: [phone]  Hmong/Algerian/Romanian: [phone]  Preadmission Nursing Department Fax Number: [phone] (Fax all pre-operative paperwork to this number)    For urgent matters arising during evenings, weekends, or holidays that cannot wait for normal business hours please call [phone] and ask for the dermatology resident on call to be paged. Wound Care After a Biopsy    What is a skin biopsy?  A skin biopsy allows the doctor to examine a very small piece of tissue under the microscope to determine the diagnosis and the best treatment for the skin condition. A local anesthetic (numbing medicine)  is injected with a very small needle into the skin area to be tested. A small piece of skin is taken from the area. Sometimes a suture (stitch) is used.     What are the risks of a skin biopsy?  I will experience scar, bleeding, swelling, pain, crusting and redness. I may experience incomplete removal or recurrence. Risks of this procedure are excessive bleeding, bruising, infection, nerve damage, numbness, thick (hypertrophic or keloidal) scar and non-diagnostic biopsy.    How should I care for my wound for the first 24 hours?  Keep the wound dry and covered for 24 hours  If it bleeds, hold direct pressure on the area for 15 minutes. If bleeding does not stop then go to the emergency room  Avoid strenuous exercise the first 1-2 days or as your doctor instructs you    How should I care for the wound  after 24 hours?  After 24 hours, remove the bandage  You may bathe or shower as normal  If you had a scalp biopsy, you can shampoo as usual and can use shower water to clean the biopsy site daily  Clean the wound twice a day with gentle soap and water  Do not scrub, be gentle  Apply white petroleum/Vaseline after cleaning the wound with a cotton swab or a clean finger, and keep the site covered with a Bandaid /bandage. Bandages are not necessary with a scalp biopsy  If you are unable to cover the site with a Bandaid /bandage, re-apply ointment 2-3 times a day to keep the site moist. Moisture will help with healing  Avoid strenuous activity for first 1-2 days  Avoid lakes, rivers, pools, and oceans until the stitches are removed or the site is healed    How do I clean my wound?  Wash hands thoroughly with soap or use hand  before all wound care  Clean the wound with gentle soap and water  Apply white petroleum/Vaseline  to wound after it is clean  Replace the Bandaid /bandage to keep the wound covered for the first few days or as instructed by your doctor  If you had a scalp biopsy, warm shower water to the area on a daily basis should suffice    What should I use to clean my wound?   Cotton-tipped applicators (Qtips )  White petroleum jelly (Vaseline ). Use a clean new container and use Q-tips to apply.  Bandaids   as needed  Gentle soap     How should I care for my wound long term?  Do not get your wound dirty  Keep up with wound care for one week or until the area is healed.  A small scab will form and fall off by itself when the area is completely healed. The area will be red and will become pink in color as it heals. Sun protection is very important for how your scar will turn out. Sunscreen with an SPF 30 or greater is recommended once the area is healed.  If you have stitches, stitches need to be removed in 14 days. You may return to our clinic for this or you may have it done locally at your doctor s  office.  You should have some soreness but it should be mild and slowly go away over several days. Talk to your doctor about using tylenol for pain,    When should I call my doctor?  If you have increased:   Pain or swelling  Pus or drainage (clear or slightly yellow drainage is ok)  Temperature over 100F  Spreading redness or warmth around wound    When will I hear about my results?  The biopsy results can take 2 weeks to come back.  Your results will automatically release to P21 before your provider has even reviewed them.  The clinic will call you with the results, send you a LocaMap message, or have you schedule a follow-up clinic or phone time to discuss the results.  Contact our clinics if you do not hear from us in 2 weeks.    Who should I call with questions?  [facility]: [phone]  [facility]: [phone]  For urgent needs outside of business hours call the [facility] at [phone] and ask for the dermatology resident on call

## 2023-04-07 NOTE — LETTER
4/7/2023       RE: Gemma Garvey  [address]  [facility] [address]     Dear Colleague,    Thank you for referring your patient, Gemma Garvey, to the [facility] [place] [facility]. Please see a copy of my visit note below.    [facility] Dermatology Note  Encounter Date: Apr 7, 2023  Office Visit     Dermatology Problem List:  # NUB, Right perispinal mid back superior, ddx: lentigo vs dysplastic nevus, rule out melanoma  # NUB, Right perispinal mid back inferior, ddx: lentigo vs dysplastic nevus, rule out melanoma  - Shave bx 4/7/2023     # Two cousins with melanoma.  ____________________________________________    Assessment & Plan:    # NUB, Right perispinal mid back superior, ddx: lentigo vs dysplastic nevus, rule out melanoma  # NUB, Right perispinal mid back inferior, ddx: lentigo vs dysplastic nevus, rule out melanoma  - Shave biopsy today, see procedure note below    # Cherry angiomas  - Reassured of benign nature     # Multiple benign nevi  # Solar lentigines  - Monitor for ABCDEs of melanoma   - Continue sun protection - recommend SPF 30 or higher with frequent application   - Return sooner if noticing changing or symptomatic lesions    Procedures Performed:   - Shave biopsy procedure note, location(s): see above. After discussion of benefits and risks including but not limited to bleeding, infection, scar, incomplete removal, recurrence, and non-diagnostic biopsy, written consent and photographs were obtained. The area was cleaned with isopropyl alcohol. 0.5mL of 1% lidocaine with epinephrine was injected to obtain adequate anesthesia of lesion(s). Shave biopsy at site(s) performed. Hemostasis was achieved with aluminium chloride. Petrolatum ointment and a sterile dressing were applied. The patient tolerated the procedure and no complications were noted. The patient was provided with verbal and  written post care instructions.     Follow-up: 1 year, sooner if concerns. Or pending path results.    Staff and Scribe:     Scribe Disclosure:  I, SHANNON LI, am serving as a scribe to document services personally performed by Adela Schmidt MD based on data collection and the provider's statements to me.     Provider Disclosure:   The documentation recorded by the scribe accurately reflects the services I personally performed and the decisions made by me.    Adela Schmidt MD    Department of Dermatology  [facility]: Phone: [phone], Fax: [phone]  4/8/2023     ____________________________________________    CC: Skin Check (Gemma is here today for a skin check. )    HPI:  Ms. Gemma Garvey is a(n) 52 year old female who presents today as a return patient for FBSE. Last seen by myself on 9/22/21, at which time the patient had no lesions of concern.    She has had two cousins with melanoma. No first degree relatives with skin cancer. History of using tanning oil and high sun exposure.     Patient is otherwise feeling well, without additional skin concerns.    Labs Reviewed:  N/A    Physical Exam:  Vitals: LMP 11/26/2011   SKIN: Full skin, excluding the groin, which includes the head/face, both arms, chest, back, abdomen,both legs, buttocks, digits and/or nails, was examined.  - Herrera Skin Type I (red hair, blue eyes)  - There are dome shaped bright red papules on the trunk and extremities.   - Multiple regular brown pigmented macules and papules are identified on the trunk and extremities.   - Scattered brown macules on sun exposed areas.  - Right perispinal mid back superior, even brown macule with sightly irregular pigment on dermoscopy.   - Right perispinal mid back inferior, even brown macule with sightly irregular pigment on dermoscopy.   - No other lesions of concern on areas examined.      Medications:  Current Outpatient Medications   Medication    cyanocobalamin (VITAMIN B-12) 100 MCG tablet    ferrous sulfate (FEROSUL) 325 (65 Fe) MG tablet    fish oil-omega-3 fatty acids 1000 MG capsule    Vitamin D3 (CHOLECALCIFEROL) 25 mcg (1000 units) tablet    LORazepam (ATIVAN) 0.5 MG tablet     No current facility-administered medications for this visit.      Past Medical History:   Patient Active Problem List   Diagnosis    Uterovaginal prolapse, incomplete     Past Medical History:   Diagnosis Date    Anemia, unspecified '93    Anemia- now resolved    Depressive disorder, not elsewhere classified '98    Depression (non-psychotic)-resolved with Paxil        SINDHU Referred Self, MD  No address on file on close of this encounter.

## 2023-04-07 NOTE — NURSING NOTE
Lidocaine-epinephrine 1-1:602673 % injection   1mL once for one use, starting 4/7/2023 ending 4/7/2023,  2mL disp, R-0, injection  Injected by Mariann Aranda RN

## 2023-04-12 LAB
PATH REPORT.COMMENTS IMP SPEC: NORMAL
PATH REPORT.COMMENTS IMP SPEC: NORMAL
PATH REPORT.FINAL DX SPEC: NORMAL
PATH REPORT.GROSS SPEC: NORMAL
PATH REPORT.MICROSCOPIC SPEC OTHER STN: NORMAL
PATH REPORT.RELEVANT HX SPEC: NORMAL

## 2023-06-06 ENCOUNTER — TELEPHONE (OUTPATIENT)
Dept: GASTROENTEROLOGY | Facility: CLINIC | Age: 53
End: 2023-06-06
Payer: COMMERCIAL

## 2023-06-06 NOTE — TELEPHONE ENCOUNTER
"Endoscopy Scheduling Screen    Have you had a positive Covid test in the last 14 days?  No    Are you active on MyChart?   Yes    What insurance is in the chart?  Other:  [employer]    Ordering/Referring Provider: Juancarlos   (If ordering provider performs procedure, schedule with ordering provider unless otherwise instructed. )    BMI: Estimated body mass index is 22.28 kg/m  as calculated from the following:    Height as of 2/8/23: 1.753 m (5' 9\").    Weight as of 2/8/23: 68.4 kg (150 lb 14.4 oz).     Sedation Ordered  moderate sedation.    If patient BMI > 50 do not schedule in ASC.    Are you taking any prescription medications for pain?   No    Are you taking methadone or Suboxone?  No    Do you have a history of malignant hyperthermia or adverse reaction to anesthesia?  No    (Females) Are you currently pregnant?   No     Have you been diagnosed or told you have pulmonary hypertension?   No    Do you have an LVAD?  No    Have you been told you have moderate to severe sleep apnea?  No    Have you been told you have COPD, asthma, or any other lung disease?  No    Do you have any heart conditions?  No     Have you ever had or are you awaiting a heart or lung transplant?   No    Have you had a stroke or transient ischemic attack (TIA aka \"mini stroke\" in the last 6 months?   No    Have you been diagnosed with or been told you have cirrhosis of the liver?   No    Are you currently on dialysis?   No    Do you need assistance transferring?   No    BMI: Estimated body mass index is 22.28 kg/m  as calculated from the following:    Height as of 2/8/23: 1.753 m (5' 9\").    Weight as of 2/8/23: 68.4 kg (150 lb 14.4 oz).     Is patients BMI > 40 and scheduling location [facility]?  No    Do you take the medication Phentermine, Ozempic or Wegovy?  No    Do you take the medication Naltrexone?  No    Do you take blood thinners?  No    Preferred Pharmacy:        [facility]/pharm[facility] #[id number] - [place], [place] - [address] [facility]  [address]  [facility] [address]  Phone: [phone] Fax: [phone]      Prep   Are you currently on dialysis or do you have chronic kidney disease?  No (standard prep)    Do you have a diagnosis of diabetes?  No    Do you have a diagnosis of cystic fibrosis (CF)?  No    On a regular basis do you go 3 -5 days between bowel movements?  No    BMI > 40?  No    Final Scheduling Details   Colonoscopy prep sent?  MiraLAX (No Mag Citrate)    Procedure scheduled  Colonoscopy    Surgeon:  Burak     Date of procedure:  9/5/23     Schedule PAC:   No    Location  [facility] - [facility]    Sedation   Moderate Sedation    Patient Reminders:    You will receive a call from a Nurse to review instructions and health history.  This assessment must be completed prior to your procedure.  Failure to complete the Nurse assessment may result in the procedure being cancelled.       On the day of your procedure, please designate an adult(s) who can drive you home stay with you for the next 24 hours. The medicines used in the exam will make you sleepy. You will not be able to drive.       You cannot take public transportation, ride share services, or non-medical taxi service without a responsible caregiver.  Medical transport services are allowed with the requirement that a responsible caregiver will receive you at your destination.  We require that drivers and caregivers are confirmed prior to your procedure.

## 2023-06-23 ENCOUNTER — TELEPHONE (OUTPATIENT)
Dept: GASTROENTEROLOGY | Facility: CLINIC | Age: 53
End: 2023-06-23
Payer: COMMERCIAL

## 2023-06-23 NOTE — TELEPHONE ENCOUNTER
Caller: Gemma Garvey    Reason for Reschedule/Cancellation (please be detailed, any staff messages or encounters to note?): needs to reschedule needs a ride      Prior to reschedule please review:    Ordering Provider: Danielle Bauer MD    Sedation per order: moderate    Does patient have any ASC Exclusions, please identify?: NA      Notes on Cancelled Procedure:    Procedure: Lower Endoscopy [Colonoscopy]     Date: 9/5/23    Location: [facility] [place]; [address] [place] [address], [address] [place] [address]    Surgeon: ERLIN      Rescheduled: Yes    Procedure: Lower Endoscopy [Colonoscopy]     Date: 10/16/23    Location: [facility] [place]; [address] [place] [address], [address] [place] [address]    Surgeon: ERLIN    Sedation Level Scheduled  MODERATE,  Reason for Sedation Level PER ORDER     Prep/Instructions updated and sent: YES/MYCHART     Send In - basket message to Panc - Mal Pool if E[facility]  procedure is canceled or rescheduled: [ N/A, YES or NO] N/A

## 2023-09-12 ENCOUNTER — TELEPHONE (OUTPATIENT)
Dept: DERMATOLOGY | Facility: CLINIC | Age: 53
End: 2023-09-12
Payer: COMMERCIAL

## 2023-09-12 NOTE — TELEPHONE ENCOUNTER
LVM for patient regarding rescheduling to Katie Bowden with Dr. Schmidt same date, same time or rescheduling to a different day and time here at the [facility]. Left my direct number for scheduling and questions.

## 2023-10-03 ENCOUNTER — TELEPHONE (OUTPATIENT)
Dept: GASTROENTEROLOGY | Facility: CLINIC | Age: 53
End: 2023-10-03
Payer: COMMERCIAL

## 2023-10-03 NOTE — TELEPHONE ENCOUNTER
Pre assessment completed for upcoming procedure.   (Please see previous telephone encounter notes for complete details)    Patient  returned call.       Procedure details:    Arrival time and facility location reviewed.    Pre op exam needed? N/A    Designated  policy reviewed. Instructed to have someone stay 6 hours post procedure.     COVID policy reviewed.      Medication review:    Medications reviewed. Please see supporting documentation below. Holding recommendations discussed (if applicable).       Prep for procedure:     Procedure prep instructions reviewed.        Additional information needed?  N/A      Patient  verbalized understanding and had no questions or concerns at this time.      Lorin Guido RN  Endoscopy Procedure Pre Assessment RN  [phone] option 4

## 2023-10-03 NOTE — TELEPHONE ENCOUNTER
Attempted to contact patient in order to complete pre assessment questions.     No answer. Left message to return call to [phone] option 4      Procedure details:    Patient scheduled for Colonoscopy  on 10/16/23.     Arrival time: 1015. Procedure time 1115    Pre op exam needed? N/A    [facility]; [address] [place] [address], 5[address] Floor, [place] [address]    Sedation type: Conscious sedation     Indication for procedure: Screening      Chart review:     Electronic implanted devices? No    Diabetic? No    Diabetic medication HOLDING recommendations: (if applicable)  Oral diabetic medications: No  Diabetic injectables: No  Insulin: No      Medication review:    Anticoagulants? No    NSAIDS? No    Other medication HOLDING recommendations:  Ferrous Sulfate (iron supplement): HOLD 7 days before procedure.      Prep for procedure:     Bowel prep recommendation: Miralax without magnesium citrate  Due to: standard prep due to recall.    Prep instructions sent via [employer].     Lorin Wolfe RN  Endoscopy Procedure Pre Assessment RN

## 2023-10-09 ENCOUNTER — TELEPHONE (OUTPATIENT)
Dept: GASTROENTEROLOGY | Facility: CLINIC | Age: 53
End: 2023-10-09
Payer: COMMERCIAL

## 2023-10-09 NOTE — TELEPHONE ENCOUNTER
Patient returned call to confirm new appointment date and time works. Writer sent updated instructions via [employer].

## 2023-10-09 NOTE — TELEPHONE ENCOUNTER
----- Message from Lorin Wolfe, JACQUES sent at 10/9/2023  2:24 PM CDT -----  Regarding: Cancel procedure  Hi there,    I received a MyChart message from Pt.     Carlton Garvey, Gemma Wolfe, Lorin KHAN RN  Phone Number: [phone]    I'm very sorry to report that I need to cancel my oct 16th colonoscopy. I just learned that my  needs to be out of town that day and cannot come with me.    I will call on Monday to reschedule.    Apologies.    Gemma    Please cancel procedure on 16th and call pt to reschedule.     Thank you    Lorin Wolfe RN on 10/9/2023 at 2:25 PM

## 2023-10-09 NOTE — TELEPHONE ENCOUNTER
Caller: Writer to patient  Reason for Reschedule/Cancellation (please be detailed, any staff messages or encounters to note?): Ride will be out of town      Prior to reschedule please review:  Ordering Provider: Danielle Bauer MD   Sedation per order: Moderate  Does patient have any ASC Exclusions, please identify?: No      Notes on Cancelled Procedure:  Procedure: Lower Endoscopy [Colonoscopy]   Date: 10/16/2023  Location: [facility]; [address] [place] [address], [address] [place] [address]  Surgeon: Burak      Rescheduled: Yes  Procedure: Lower Endoscopy [Colonoscopy]   Date: 10/23/2023  Location: [facility]; [address] [place] [address], [address] [place] [address]  Surgeon: Burak  Sedation Level Scheduled  Moderate,  Reason for Sedation Level Order  Prep/Instructions updated and sent: Cindy

## 2023-10-10 NOTE — TELEPHONE ENCOUNTER
RESCHEDULED Colonoscopy    Pre assessment completed for upcoming procedure.      Procedure details:    Patient scheduled for Colonoscopy  on 10/23/23.     Arrival time: 1130. Procedure time 1230    Pre op exam needed? N/A    Facility location: [facility]; [address] [place] [address], [address], [place] [address]    Sedation type: Conscious sedation     Indication for procedure: screening    COVID policy reviewed.    Designated  policy reviewed. Instructed to have someone stay 6 hours post procedure.       Chart review:     Electronic implanted devices? No    Diabetic? No    Medication review:    Anticoagulants? No    NSAIDS? No    Other medication HOLDING recommendations:  Iron supplements: HOLD 7 days before procedure.      Prep for procedure:     Bowel prep recommendation: Miralax without magnesium citrate  Due to: standard prep due to recall.    Prep instructions sent via [employer]     Reviewed procedure prep instructions.     Patient verbalized understanding and had no questions or concerns at this time.        Hien Cohen RN  Endoscopy Procedure Pre Assessment RN  [phone] option 4

## 2023-10-23 ENCOUNTER — HOSPITAL ENCOUNTER (OUTPATIENT)
Facility: AMBULATORY SURGERY CENTER | Age: 53
Discharge: HOME | End: 2023-10-23
Attending: INTERNAL MEDICINE | Admitting: INTERNAL MEDICINE
Payer: COMMERCIAL

## 2023-10-23 VITALS
HEIGHT: 69 IN | TEMPERATURE: 97.1 F | HEART RATE: 56 BPM | SYSTOLIC BLOOD PRESSURE: 101 MMHG | OXYGEN SATURATION: 100 % | WEIGHT: 150 LBS | BODY MASS INDEX: 22.22 KG/M2 | DIASTOLIC BLOOD PRESSURE: 61 MMHG | RESPIRATION RATE: 14 BRPM

## 2023-10-23 LAB — COLONOSCOPY: NORMAL

## 2023-10-23 PROCEDURE — 45378 DIAGNOSTIC COLONOSCOPY: CPT | Mod: 33 | Performed by: INTERNAL MEDICINE

## 2023-10-23 RX ORDER — ONDANSETRON 2 MG/ML
4 INJECTION INTRAMUSCULAR; INTRAVENOUS
Status: DISCONTINUED | OUTPATIENT
Start: 2023-10-23 | End: 2023-10-24 | Stop reason: HOSPADM

## 2023-10-23 RX ORDER — FENTANYL CITRATE 50 UG/ML
INJECTION, SOLUTION INTRAMUSCULAR; INTRAVENOUS DAILY PRN
Status: DISCONTINUED | OUTPATIENT
Start: 2023-10-23 | End: 2023-10-23 | Stop reason: HOSPADM

## 2023-10-23 RX ORDER — LIDOCAINE 40 MG/G
CREAM TOPICAL
Status: DISCONTINUED | OUTPATIENT
Start: 2023-10-23 | End: 2023-10-24 | Stop reason: HOSPADM

## 2023-10-23 NOTE — H&P
Gemma Garvey  [id number]  female  52 year old      Reason for procedure/surgery: screening    Patient Active Problem List   Diagnosis    Uterovaginal prolapse, incomplete       Past Surgical History:    Past Surgical History:   Procedure Laterality Date    ABDOMEN SURGERY  2013    Umbilical hernia    HERNIA REPAIR         Past Medical History:   Past Medical History:   Diagnosis Date    Anemia, unspecified '    Anemia- now resolved    Depressive disorder, not elsewhere classified '    Depression (non-psychotic)-resolved with Paxil       Social History:   Social History     Tobacco Use    Smoking status: Never    Smokeless tobacco: Never   Substance Use Topics    Alcohol use: Yes     Comment: 3 times per month       Family History:   Family History   Problem Relation Age of Onset    Hypertension Father          from pulmonary embolism    Circulatory Father         PE    Hypertension Mother     Cerebrovascular Disease Maternal Grandmother         Dementia    Hypertension Sister     Breast Cancer Sister     Neurologic Disorder Sister         migraines    Breast Cancer Sister     Breast Cancer Paternal Aunt        Allergies:   Allergies   Allergen Reactions    Cats     No Known Drug Allergy        Active Medications:   Current Outpatient Medications   Medication Sig Dispense Refill    cyanocobalamin (VITAMIN B-12) 100 MCG tablet Take 100 mcg by mouth daily      ferrous sulfate (FEROSUL) 325 (65 Fe) MG tablet Take 325 mg by mouth daily (with breakfast)      fish oil-omega-3 fatty acids 1000 MG capsule Take 2 g by mouth daily      Vitamin D3 (CHOLECALCIFEROL) 25 mcg (1000 units) tablet Take by mouth daily      LORazepam (ATIVAN) 0.5 MG tablet Take 1 tablet (0.5 mg) by mouth every 6 hours as needed for anxiety (Patient not taking: Reported on 2023) 10 tablet 3       Systemic Review:   CONSTITUTIONAL: NEGATIVE for fever, chills, change in weight  ENT/MOUTH: NEGATIVE for ear, mouth and  "throat problems  RESP: NEGATIVE for significant cough or SOB  CV: NEGATIVE for chest pain, palpitations or peripheral edema    Physical Examination:   Vital Signs: /80 (BP Location: Right arm)   Pulse 59   Temp 97.5  F (36.4  C) (Temporal)   Resp 18   Ht 1.753 m (5' 9\")   Wt 68 kg (150 lb)   LMP 11/26/2011   SpO2 100%   BMI 22.15 kg/m    GENERAL: healthy, alert and no distress  NECK: no adenopathy, no asymmetry, masses, or scars  RESP: lungs clear to auscultation - no rales, rhonchi or wheezes  CV: regular rate and rhythm, normal S1 S2, no S3 or S4, no murmur, click or rub, no peripheral edema and peripheral pulses strong  ABDOMEN: soft, nontender, no hepatosplenomegaly, no masses and bowel sounds normal  MS: no gross musculoskeletal defects noted, no edema    Plan: Appropriate to proceed as scheduled.      Charlene Sumner MD  10/23/2023    PCP:  Yola Jones    "

## 2023-12-14 ENCOUNTER — ANCILLARY PROCEDURE (OUTPATIENT)
Dept: MAMMOGRAPHY | Facility: CLINIC | Age: 53
End: 2023-12-14
Attending: PHYSICIAN ASSISTANT
Payer: COMMERCIAL

## 2023-12-14 DIAGNOSIS — Z12.31 VISIT FOR SCREENING MAMMOGRAM: ICD-10-CM

## 2023-12-14 PROCEDURE — 77067 SCR MAMMO BI INCL CAD: CPT | Mod: GC

## 2023-12-14 PROCEDURE — 77063 BREAST TOMOSYNTHESIS BI: CPT | Mod: GC

## 2024-02-20 ENCOUNTER — OFFICE VISIT (OUTPATIENT)
Dept: CARDIOLOGY | Facility: CLINIC | Age: 54
End: 2024-02-20
Payer: COMMERCIAL

## 2024-02-20 VITALS
HEART RATE: 48 BPM | HEIGHT: 69 IN | BODY MASS INDEX: 22.53 KG/M2 | SYSTOLIC BLOOD PRESSURE: 118 MMHG | OXYGEN SATURATION: 99 % | DIASTOLIC BLOOD PRESSURE: 60 MMHG | WEIGHT: 152.1 LBS

## 2024-02-20 DIAGNOSIS — E78.2 MIXED HYPERLIPIDEMIA: Primary | ICD-10-CM

## 2024-02-20 DIAGNOSIS — I49.3 PVC'S (PREMATURE VENTRICULAR CONTRACTIONS): ICD-10-CM

## 2024-02-20 DIAGNOSIS — Z13.6 ENCOUNTER FOR SCREENING FOR CORONARY ARTERY DISEASE: ICD-10-CM

## 2024-02-20 PROCEDURE — 93000 ELECTROCARDIOGRAM COMPLETE: CPT | Performed by: INTERNAL MEDICINE

## 2024-02-20 PROCEDURE — 99204 OFFICE O/P NEW MOD 45 MIN: CPT | Performed by: INTERNAL MEDICINE

## 2024-02-20 NOTE — PATIENT INSTRUCTIONS
February 20, 2024    Thank you for allowing our Cardiology team to participate in your care.     Please note the following changes to your heart treatment plan:     Medication changes:   - none    Tests to be done:  - CT coronary calcium scan  - ZioPatch monitor     Follow up:  - Follow up as needed      For scheduling, please call [phone].      Please contact our team through [employer] or our Nurse Team Voicemail service [phone], or the General Clinic [phone] for any questions or concerns.     If you are having a medical emergency, please call [phone].     Sincerely,    Regan Holley MD, [facility]  Cardiology    [facility] - [facility] - [facility] - [facility]

## 2024-02-20 NOTE — LETTER
2/20/2024    Jerry Petersen MD  [address]  [place] [facility] [address]    RE: Gemma Garvey       Dear Colleague,     I had the pleasure of seeing Gemma Garvey in the [facility].      Cardiology Clinic Consultation:  Preventive Cardiology Visit    February 20, 2024   Patient Name: Gemma Garvey  Patient MRN: [id number]    HPI:    I had the opportunity to see patient Gemma Garvey in cardiology clinic for a consultation. Patient is followed by our colleague Dr. Nicola MD with Primary Care.     As you know patient is a pleasant 53-year-old female with no significant past medical history presents for a preventive cardiology visit.    Patient is very physically active at baseline, exercises throughout the week, engaging in exercises such as swimming, resistance training/weightlifting.  Also walks sometimes for exercise.  She denies any chest pain, chest pressure, abnormal shortness of breath.  She follows a low-fat diet, incorporating vegetables, fiber, olive oil, lean meat.  No family history of early ASCVD in first-degree relatives.      Over the past few years, she has noticed that her cholesterol profile has changed, most recently lipid panel from 11/16/2023 demonstrated total cholesterol 205, triglycerides 49, , LDL 89, prior lipids from 12/22/2022 demonstrate total cholesterol 194, triglycerides 38, , LDL 86, lipid panel 8/26/2021 total cholesterol 199, triglycerides 51, , LDL 87.     Blood pressure today in clinic 118/60 mmHg.      ECG demonstrates normal sinus rhythm at 77 bpm, normal axis, normal intervals, frequent PVCs (2 PVCs).  QTc 441 ms.    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  No presyncope/syncope.  Patient does not smoke cigarettes.  Drinks alcohol only  occasionally.    Assessment and Plan/Recommendations:    In summary, patient is a pleasant 53-year-old female who presents for a preventive cardiology visit.  Overall patient is quite physically active, without symptoms concerning for angina or decompensated heart failure.  Her lipid profile is favorable, total cholesterol is elevated however this reflects an elevated HDL level, her LDL remains within normal range, and has been stable over the past several years (87 8/2021, 86 12/2022, 89 11/2023.  10-year ASCVD risk score 0.9%.  No significant family history of early ASCVD.  Blood pressure is within normal range, diet is in keeping with ACC/AHA guideline recommendations.  We discussed options for further evaluation/management.  Given her overall favorable cardiovascular disease risk profile, I would not recommend starting specific pharmacotherapy for her lipids at this time, and instead I encouraged continued healthy lifestyle habits, including regular aerobic exercise, continuing a heart healthy Mediterranean type diet.  Patient inquires about further testing including checking ApoB levels.  Discussed that given her overall risk profile, this would not necessarily , though a coronary CT calcium scan may be beneficial for further risk stratification.  Discussed risk/benefits, cost may not be met by insurance.  Patient voiced understanding would like to proceed.    She was noted to have frequent PVCs on ECG today, will assess with a Zio patch monitor.  Depending on findings, may require further evaluation with a TTE.  Of note, thyroid testing was normal a couple of months ago through [facility].  Will follow-up on these results.      Thank you for allowing our team to participate in the care of Gemma Garvey.  Please do not hesitate to call or page me with any questions or concerns.    Sincerely,     Regan Holley MD, [facility]  Cardiology  February 20, 2024    Total time spent  on this encounter today: Greater than 45 minutes, providing care in this encounter including, but not limited to, reviewing prior medical records, laboratory data, imaging studies, diagnostic studies, procedure notes, formulating an assessment and plan, recommendations, discussion and counseling with patient face to face, dictation.    Past Medical History:     The 10-year ASCVD risk score (Yvette PEPPER, et al., 2019) is: 0.9%    Values used to calculate the score:      Age: 53 years      Sex: Female      Is Non- : No      Diabetic: No      Tobacco smoker: No      Systolic Blood Pressure: 118 mmHg      Is BP treated: No      HDL Cholesterol: 100 mg/dL      Total Cholesterol: 194 mg/dL  Patient Active Problem List   Diagnosis    Uterovaginal prolapse, incomplete       Past Surgical History:   Past Surgical History:   Procedure Laterality Date    ABDOMEN SURGERY  October 2013    Umbilical hernia    COLONOSCOPY N/A 10/23/2023    Procedure: Colonoscopy;  Surgeon: Charlene Sumner MD;  Location: [facility] OR    HERNIA REPAIR  2013       Medications (outpatient):  Current Outpatient Medications   Medication Sig Dispense Refill    cyanocobalamin (VITAMIN B-12) 100 MCG tablet Take 100 mcg by mouth daily      ferrous sulfate (FEROSUL) 325 (65 Fe) MG tablet Take 325 mg by mouth daily (with breakfast)      fish oil-omega-3 fatty acids 1000 MG capsule Take 2 g by mouth daily      LORazepam (ATIVAN) 0.5 MG tablet Take 1 tablet (0.5 mg) by mouth every 6 hours as needed for anxiety 10 tablet 3    Vitamin D3 (CHOLECALCIFEROL) 25 mcg (1000 units) tablet Take by mouth daily         Allergies:  Allergies   Allergen Reactions    Cats     No Known Drug Allergy        Social History:   History   Drug Use No      History   Smoking Status    Never   Smokeless Tobacco    Never     Social History    Substance and Sexual Activity      Alcohol use: Yes        Comment: 3 times per month       Family History:  Family History  "  Problem Relation Age of Onset    Hypertension Father          from pulmonary embolism    Circulatory Father         PE    Hypertension Mother     Cerebrovascular Disease Maternal Grandmother         Dementia    Hypertension Sister     Breast Cancer Sister     Neurologic Disorder Sister         migraines    Breast Cancer Sister     Breast Cancer Paternal Aunt        Review of Systems:   A comprehensive 12 system review of systems was carried out.  Pertinent positives and negatives are noted above. Otherwise negative for contributory information.    Objective & Physical Exam:  /60   Pulse (!) 48   Ht 1.753 m (5' 9\")   Wt 69 kg (152 lb 1.6 oz)   LMP 2011   SpO2 99%   BMI 22.46 kg/m    Wt Readings from Last 2 Encounters:   24 69 kg (152 lb 1.6 oz)   10/23/23 68 kg (150 lb)     Body mass index is 22.46 kg/m .   Body surface area is 1.83 meters squared.    Constitutional: appears stated age, in no apparent distress, appears to be well nourished  Pulmonary: clear to auscultation bilaterally, no wheezes, no rales, no increased work of breathing  Cardiovascular: JVP normal, regular rate, regular rhythm, normal S1 and S2, no S3, S4, no murmur appreciated, no lower extremity edema  Gastrointestinal: no guarding, non-rigid   Neurologic: awake, alert, moves all extremities  Skin: no jaundice, warm on limited exam    Data reviewed:  Lab Results   Component Value Date    WBC 3.5 (L) 2022    WBC 4.8 2006    RBC 4.07 2022    RBC 4.31 2006    HGB 12.7 2022    HGB 13.0 2006    HCT 39.6 2022    HCT 39.4 2006    MCV 97 2022    MCV 91 2006    MCH 31.2 2022    MCH 30.2 2006    MCHC 32.1 2022    MCHC 33.0 2006    RDW 13.2 2022    RDW 15.2 (H) 2006     2022     2006     Sodium   Date Value Ref Range Status   2022 139 136 - 145 mmol/L Final   2006 142 133 - 144 mmol/L Final "     Potassium   Date Value Ref Range Status   12/22/2022 4.6 3.4 - 5.3 mmol/L Final   08/26/2021 4.8 3.4 - 5.3 mmol/L Final   04/22/2006 4.1 3.4 - 5.3 mmol/L Final     Chloride   Date Value Ref Range Status   12/22/2022 103 98 - 107 mmol/L Final   08/26/2021 107 94 - 109 mmol/L Final   04/22/2006 103 94 - 109 mmol/L Final     Carbon Dioxide   Date Value Ref Range Status   04/22/2006 29 20 - 32 mmol/L Final     Carbon Dioxide (CO2)   Date Value Ref Range Status   12/22/2022 26 22 - 29 mmol/L Final   08/26/2021 30 20 - 32 mmol/L Final     Anion Gap   Date Value Ref Range Status   12/22/2022 10 7 - 15 mmol/L Final   08/26/2021 1 (L) 3 - 14 mmol/L Final   04/22/2006 10 6 - 17 mmol/L Final     Glucose   Date Value Ref Range Status   12/22/2022 93 70 - 99 mg/dL Final   08/26/2021 93 70 - 99 mg/dL Final   04/22/2006 108 60 - 110 mg/dL Final     Urea Nitrogen   Date Value Ref Range Status   12/22/2022 14.5 6.0 - 20.0 mg/dL Final   08/26/2021 13 7 - 30 mg/dL Final   04/22/2006 15 5 - 24 mg/dL Final     Creatinine   Date Value Ref Range Status   12/22/2022 0.85 0.51 - 0.95 mg/dL Final   04/22/2006 0.93 0.60 - 1.30 mg/dL Final     GFR Estimate   Date Value Ref Range Status   12/22/2022 81 >60 mL/min/1.73m2 Final     Comment:     Effective December 21, 2021 eGFRcr in adults is calculated using the 2021 CKD-EPI creatinine equation which includes age and gender (Brandi et al., NEJM, DOI: 10.1056/HKNLxv1051562)   04/22/2006 72 >60 mL/min/1.7m2 Final     Calcium   Date Value Ref Range Status   12/22/2022 9.8 8.6 - 10.0 mg/dL Final   04/22/2006 8.9 8.5 - 10.4 mg/dL Final     Bilirubin Total   Date Value Ref Range Status   08/26/2021 0.4 0.2 - 1.3 mg/dL Final   04/22/2006 0.3 0.2 - 1.3 mg/dL Final     Alkaline Phosphatase   Date Value Ref Range Status   08/26/2021 68 40 - 150 U/L Final   04/22/2006 56 40 - 150 U/L Final     ALT   Date Value Ref Range Status   08/26/2021 29 0 - 50 U/L Final   04/22/2006 21 0 - 50 U/L Final     AST    Date Value Ref Range Status   08/26/2021 24 0 - 45 U/L Final   04/22/2006 33 0 - 45 U/L Final     Recent Labs   Lab Test 12/22/22  0912 08/26/21  0711   CHOL 194 199    102   LDL 86 87   TRIG 38 51          Thank you for allowing me to participate in the care of your patient.      Sincerely,     Regan Holley MD     [facility]  cc:   No referring provider defined for this encounter.

## 2024-02-20 NOTE — PROGRESS NOTES
Cardiology Clinic Consultation:  Preventive Cardiology Visit    February 20, 2024   Patient Name: Gemma Garvey  Patient MRN: [id number]    HPI:    I had the opportunity to see patient Gemma Garvey in cardiology clinic for a consultation. Patient is followed by our colleague Dr. Nicola MD with [facility].     As you know patient is a pleasant 53-year-old female with no significant past medical history presents for a preventive cardiology visit.    Patient is very physically active at baseline, exercises throughout the week, engaging in exercises such as swimming, resistance training/weightlifting.  Also walks sometimes for exercise.  She denies any chest pain, chest pressure, abnormal shortness of breath.  She follows a low-fat diet, incorporating vegetables, fiber, olive oil, lean meat.  No family history of early ASCVD in first-degree relatives.      Over the past few years, she has noticed that her cholesterol profile has changed, most recently lipid panel from 11/16/2023 demonstrated total cholesterol 205, triglycerides 49, , LDL 89, prior lipids from 12/22/2022 demonstrate total cholesterol 194, triglycerides 38, , LDL 86, lipid panel 8/26/2021 total cholesterol 199, triglycerides 51, , LDL 87.     Blood pressure today in clinic 118/60 mmHg.      ECG demonstrates normal sinus rhythm at 77 bpm, normal axis, normal intervals, frequent PVCs (2 PVCs).  QTc 441 ms.    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  No presyncope/syncope.  Patient does not smoke cigarettes.  Drinks alcohol only occasionally.    Assessment and Plan/Recommendations:    In summary, patient is a pleasant 53-year-old female who presents for a preventive cardiology visit.  Overall patient is quite physically active, without symptoms concerning for  angina or decompensated heart failure.  Her lipid profile is favorable, total cholesterol is elevated however this reflects an elevated HDL level, her LDL remains within normal range, and has been stable over the past several years (87 8/2021, 86 12/2022, 89 11/2023.  10-year ASCVD risk score 0.9%.  No significant family history of early ASCVD.  Blood pressure is within normal range, diet is in keeping with ACC/AHA guideline recommendations.  We discussed options for further evaluation/management.  Given her overall favorable cardiovascular disease risk profile, I would not recommend starting specific pharmacotherapy for her lipids at this time, and instead I encouraged continued healthy lifestyle habits, including regular aerobic exercise, continuing a heart healthy Mediterranean type diet.  Patient inquires about further testing including checking ApoB levels.  Discussed that given her overall risk profile, this would not necessarily , though a coronary CT calcium scan may be beneficial for further risk stratification.  Discussed risk/benefits, cost may not be met by insurance.  Patient voiced understanding would like to proceed.    She was noted to have frequent PVCs on ECG today, will assess with a Zio patch monitor.  Depending on findings, may require further evaluation with a TTE.  Of note, thyroid testing was normal a couple of months ago through [facility].  Will follow-up on these results.      Thank you for allowing our team to participate in the care of Gemma Garvey.  Please do not hesitate to call or page me with any questions or concerns.    Sincerely,     Regan Holley MD, [facility]  Cardiology  February 20, 2024    Total time spent on this encounter today: Greater than 45 minutes, providing care in this encounter including, but not limited to, reviewing prior medical records, laboratory data, imaging studies, diagnostic studies, procedure notes, formulating an  assessment and plan, recommendations, discussion and counseling with patient face to face, dictation.    Past Medical History:     The 10-year ASCVD risk score (Yvette DK, et al., 2019) is: 0.9%    Values used to calculate the score:      Age: 53 years      Sex: Female      Is Non- : No      Diabetic: No      Tobacco smoker: No      Systolic Blood Pressure: 118 mmHg      Is BP treated: No      HDL Cholesterol: 100 mg/dL      Total Cholesterol: 194 mg/dL  Patient Active Problem List   Diagnosis    Uterovaginal prolapse, incomplete       Past Surgical History:   Past Surgical History:   Procedure Laterality Date    ABDOMEN SURGERY  2013    Umbilical hernia    COLONOSCOPY N/A 10/23/2023    Procedure: Colonoscopy;  Surgeon: Charlene Sumner MD;  Location: [facility] OR    HERNIA REPAIR         Medications (outpatient):  Current Outpatient Medications   Medication Sig Dispense Refill    cyanocobalamin (VITAMIN B-12) 100 MCG tablet Take 100 mcg by mouth daily      ferrous sulfate (FEROSUL) 325 (65 Fe) MG tablet Take 325 mg by mouth daily (with breakfast)      fish oil-omega-3 fatty acids 1000 MG capsule Take 2 g by mouth daily      LORazepam (ATIVAN) 0.5 MG tablet Take 1 tablet (0.5 mg) by mouth every 6 hours as needed for anxiety 10 tablet 3    Vitamin D3 (CHOLECALCIFEROL) 25 mcg (1000 units) tablet Take by mouth daily         Allergies:  Allergies   Allergen Reactions    Cats     No Known Drug Allergy        Social History:   History   Drug Use No      History   Smoking Status    Never   Smokeless Tobacco    Never     Social History    Substance and Sexual Activity      Alcohol use: Yes        Comment: 3 times per month       Family History:  Family History   Problem Relation Age of Onset    Hypertension Father          from pulmonary embolism    Circulatory Father         PE    Hypertension Mother     Cerebrovascular Disease Maternal Grandmother         Dementia    Hypertension  "Sister     Breast Cancer Sister     Neurologic Disorder Sister         migraines    Breast Cancer Sister     Breast Cancer Paternal Aunt        Review of Systems:   A comprehensive 12 system review of systems was carried out.  Pertinent positives and negatives are noted above. Otherwise negative for contributory information.    Objective & Physical Exam:  /60   Pulse (!) 48   Ht 1.753 m (5' 9\")   Wt 69 kg (152 lb 1.6 oz)   LMP 11/26/2011   SpO2 99%   BMI 22.46 kg/m    Wt Readings from Last 2 Encounters:   02/20/24 69 kg (152 lb 1.6 oz)   10/23/23 68 kg (150 lb)     Body mass index is 22.46 kg/m .   Body surface area is 1.83 meters squared.    Constitutional: appears stated age, in no apparent distress, appears to be well nourished  Pulmonary: clear to auscultation bilaterally, no wheezes, no rales, no increased work of breathing  Cardiovascular: JVP normal, regular rate, regular rhythm, normal S1 and S2, no S3, S4, no murmur appreciated, no lower extremity edema  Gastrointestinal: no guarding, non-rigid   Neurologic: awake, alert, moves all extremities  Skin: no jaundice, warm on limited exam    Data reviewed:  Lab Results   Component Value Date    WBC 3.5 (L) 12/22/2022    WBC 4.8 04/22/2006    RBC 4.07 12/22/2022    RBC 4.31 04/22/2006    HGB 12.7 12/22/2022    HGB 13.0 04/22/2006    HCT 39.6 12/22/2022    HCT 39.4 04/22/2006    MCV 97 12/22/2022    MCV 91 04/22/2006    MCH 31.2 12/22/2022    MCH 30.2 04/22/2006    MCHC 32.1 12/22/2022    MCHC 33.0 04/22/2006    RDW 13.2 12/22/2022    RDW 15.2 (H) 04/22/2006     12/22/2022     04/22/2006     Sodium   Date Value Ref Range Status   12/22/2022 139 136 - 145 mmol/L Final   04/22/2006 142 133 - 144 mmol/L Final     Potassium   Date Value Ref Range Status   12/22/2022 4.6 3.4 - 5.3 mmol/L Final   08/26/2021 4.8 3.4 - 5.3 mmol/L Final   04/22/2006 4.1 3.4 - 5.3 mmol/L Final     Chloride   Date Value Ref Range Status   12/22/2022 103 98 - 107 " mmol/L Final   08/26/2021 107 94 - 109 mmol/L Final   04/22/2006 103 94 - 109 mmol/L Final     Carbon Dioxide   Date Value Ref Range Status   04/22/2006 29 20 - 32 mmol/L Final     Carbon Dioxide (CO2)   Date Value Ref Range Status   12/22/2022 26 22 - 29 mmol/L Final   08/26/2021 30 20 - 32 mmol/L Final     Anion Gap   Date Value Ref Range Status   12/22/2022 10 7 - 15 mmol/L Final   08/26/2021 1 (L) 3 - 14 mmol/L Final   04/22/2006 10 6 - 17 mmol/L Final     Glucose   Date Value Ref Range Status   12/22/2022 93 70 - 99 mg/dL Final   08/26/2021 93 70 - 99 mg/dL Final   04/22/2006 108 60 - 110 mg/dL Final     Urea Nitrogen   Date Value Ref Range Status   12/22/2022 14.5 6.0 - 20.0 mg/dL Final   08/26/2021 13 7 - 30 mg/dL Final   04/22/2006 15 5 - 24 mg/dL Final     Creatinine   Date Value Ref Range Status   12/22/2022 0.85 0.51 - 0.95 mg/dL Final   04/22/2006 0.93 0.60 - 1.30 mg/dL Final     GFR Estimate   Date Value Ref Range Status   12/22/2022 81 >60 mL/min/1.73m2 Final     Comment:     Effective December 21, 2021 eGFRcr in adults is calculated using the 2021 CKD-EPI creatinine equation which includes age and gender (Brandi et al., NEJ, DOI: 10.1056/HNMFlp5333045)   04/22/2006 72 >60 mL/min/1.7m2 Final     Calcium   Date Value Ref Range Status   12/22/2022 9.8 8.6 - 10.0 mg/dL Final   04/22/2006 8.9 8.5 - 10.4 mg/dL Final     Bilirubin Total   Date Value Ref Range Status   08/26/2021 0.4 0.2 - 1.3 mg/dL Final   04/22/2006 0.3 0.2 - 1.3 mg/dL Final     Alkaline Phosphatase   Date Value Ref Range Status   08/26/2021 68 40 - 150 U/L Final   04/22/2006 56 40 - 150 U/L Final     ALT   Date Value Ref Range Status   08/26/2021 29 0 - 50 U/L Final   04/22/2006 21 0 - 50 U/L Final     AST   Date Value Ref Range Status   08/26/2021 24 0 - 45 U/L Final   04/22/2006 33 0 - 45 U/L Final     Recent Labs   Lab Test 12/22/22  0912 08/26/21  0711   CHOL 194 199    102   LDL 86 87   TRIG 38 51

## 2024-03-05 ENCOUNTER — ORDERS ONLY (AUTO-RELEASED) (OUTPATIENT)
Dept: CARDIOLOGY | Facility: CLINIC | Age: 54
End: 2024-03-05
Payer: COMMERCIAL

## 2024-03-05 DIAGNOSIS — I49.3 PVC'S (PREMATURE VENTRICULAR CONTRACTIONS): ICD-10-CM

## 2024-03-13 ENCOUNTER — HOSPITAL ENCOUNTER (OUTPATIENT)
Dept: CARDIOLOGY | Facility: CLINIC | Age: 54
Discharge: HOME | End: 2024-03-13
Attending: INTERNAL MEDICINE | Admitting: INTERNAL MEDICINE
Payer: COMMERCIAL

## 2024-03-13 DIAGNOSIS — Z13.6 ENCOUNTER FOR SCREENING FOR CORONARY ARTERY DISEASE: ICD-10-CM

## 2024-03-13 DIAGNOSIS — E78.2 MIXED HYPERLIPIDEMIA: ICD-10-CM

## 2024-03-13 PROCEDURE — 75571 CT HRT W/O DYE W/CA TEST: CPT

## 2024-03-13 PROCEDURE — 75571 CT HRT W/O DYE W/CA TEST: CPT | Mod: 26 | Performed by: INTERNAL MEDICINE

## 2024-03-18 ENCOUNTER — MYC MEDICAL ADVICE (OUTPATIENT)
Dept: CARDIOLOGY | Facility: CLINIC | Age: 54
End: 2024-03-18
Payer: COMMERCIAL

## 2024-03-18 ENCOUNTER — TELEPHONE (OUTPATIENT)
Dept: CARDIOLOGY | Facility: CLINIC | Age: 54
End: 2024-03-18
Payer: COMMERCIAL

## 2024-03-18 NOTE — TELEPHONE ENCOUNTER
Spoke to patient, reviewed soft tissue portion and recommendations from Dr Holley. She verbalized understanding, no questions at this time.

## 2024-03-18 NOTE — TELEPHONE ENCOUNTER
After speaking with Patient soft tissue results to be faxed to PCP for review and management. PCP is a [facility] provider.   Message left to return call regarding CT Ca+ score soft tissue results.     ----- Message from Regan Holley MD sent at 3/18/2024  6:33 AM CDT -----  Radiology report noted a 3 mm nodule, felt to be likely benign, generally no additional follow up is recommended but for higher risk patients (ie smoking history) then they consider repeating a CT lung scan in a year, recommend she discuss this with her PCP at next routine follow up appt thanks

## 2024-03-18 NOTE — RESULT ENCOUNTER NOTE
Radiology report noted a 3 mm nodule, felt to be likely benign, generally no additional follow up is recommended but for higher risk patients (ie smoking history) then they consider repeating a CT lung scan in a year, recommend she discuss this with her PCP at next routine follow up appt thanks

## 2024-03-18 NOTE — TELEPHONE ENCOUNTER
My chart message from patient:  Wanted to let you know that I just started my heart monitor today.     Dr Holley indicated when he told me about it that I can/should just go about my daily routine. I asked him about my getting in the pool and exercising. He said all good.     After I just applied it, I read the  what not to do  and it says do not submerge in water and do not sweat. It actually says to limit shower time and to shower with back to water.     Wanted to check and see if perhaps the recommendations are known to be quite conservative and that I can, in fact, exercise? I m guessing that I shouldn t get in the pool for sure based on the booklet instructions.     Any feedback would be helpful. Thanks.     Gemma Beckford11 attempted to contact patient. Left a detailed message to try to exercise and do her daily routines as usual. Hopefully, if she can trigger her symptoms, that would be helpful.    Reviewed that high humidity and immersion in water could detach the ziopatch.     Left the Team 2 RN phone # for further questions or concerns.

## 2024-03-18 NOTE — TELEPHONE ENCOUNTER
Message left regarding test results. My Chart message also sent,  Patient to call back with any questions or concerns.     ----- Message from Regan Holley MD sent at 3/18/2024  6:26 AM CDT -----  Results reviewed, please let the patient know that overall findings are very favorable, calcium score is 0, recommend she continue healthy lifestyle habits of regular aerobic exercise and a heart healthy Mediterranean type diet. Will follow up on [employer] monitor thanks

## 2024-03-18 NOTE — RESULT ENCOUNTER NOTE
Results reviewed, please let the patient know that overall findings are very favorable, calcium score is 0, recommend she continue healthy lifestyle habits of regular aerobic exercise and a heart healthy Mediterranean type diet. Will follow up on [employer] monitor thanks

## 2024-03-29 PROCEDURE — 93244 EXT ECG>48HR<7D REV&INTERPJ: CPT | Performed by: INTERNAL MEDICINE

## 2024-04-01 NOTE — RESULT ENCOUNTER NOTE
Results reviewed, please let the patient know that overall findings are reassuring, no significant arrhythmia detected, she did have a short run of paroxysmal SVT and some PVCs in bigeminey and trigeminy, and so I recommend we assess cardiac structure and function with a TTE to be comprehensive though I suspect it will be normal thanks

## 2024-04-02 ENCOUNTER — TELEPHONE (OUTPATIENT)
Dept: CARDIOLOGY | Facility: CLINIC | Age: 54
End: 2024-04-02

## 2024-04-02 NOTE — TELEPHONE ENCOUNTER
Message from Dr. Holley re: Regan Marie MD  P [facility] Team 2  Results reviewed, please let the patient know that overall findings are reassuring, no significant arrhythmia detected, she did have a short run of paroxysmal SVT and some PVCs in bigeminey and trigeminy, and so I recommend we assess cardiac structure and function with a TTE to be comprehensive though I suspect it will be normal    0915 attempted to contact patient to review adelina report and Dr. Holley's recommendation for an echo. Will order echo once patient agrees.  Left message for patient to call back to Team 2 R.N.s @ [phone]

## 2024-04-04 NOTE — TELEPHONE ENCOUNTER
Spoke with Patient who states that when she was wearing the monitor work was very stressful and she knew she was having a lot of anxiety.    - Patient is working with her therapist and the number of stress anxiety attacks and palpitations  have decreased a lot and prefers not to do the echo recommendation at this time.    Patient states she is not concerned about the palpitations . And if symptoms worsen she will call and then possible do echo.    Dr. Holley reply regarding ZIO report  4-2-24  Regan Holley MD  P [facility] Team 2  Results reviewed, please let the patient know that overall findings are reassuring, no significant arrhythmia detected, she did have a short run of paroxysmal SVT and some PVCs in bigeminey and trigeminy, and so I recommend we assess cardiac structure and function with a TTE to be comprehensive though I suspect it will be normal    Last Dr. Holley OV 2-20-24   Assessment and Plan/Recommendations:     In summary, patient is a pleasant 53-year-old female who presents for a preventive cardiology visit.  Overall patient is quite physically active, without symptoms concerning for angina or decompensated heart failure.  Her lipid profile is favorable, total cholesterol is elevated however this reflects an elevated HDL level, her LDL remains within normal range, and has been stable over the past several years (87 8/2021, 86 12/2022, 89 11/2023.  10-year ASCVD risk score 0.9%.  No significant family history of early ASCVD.  Blood pressure is within normal range, diet is in keeping with ACC/AHA guideline recommendations.  We discussed options for further evaluation/management.  Given her overall favorable cardiovascular disease risk profile, I would not recommend starting specific pharmacotherapy for her lipids at this time, and instead I encouraged continued healthy lifestyle habits, including regular aerobic exercise, continuing a heart healthy Mediterranean type diet.  Patient inquires about further  testing including checking ApoB levels.  Discussed that given her overall risk profile, this would not necessarily , though a coronary CT calcium scan may be beneficial for further risk stratification.  Discussed risk/benefits, cost may not be met by insurance.  Patient voiced understanding would like to proceed.    She was noted to have frequent PVCs on ECG today, will assess with a Zio patch monitor.  Depending on findings, may require further evaluation with a TTE.  Of note, thyroid testing was normal a couple of months ago through [facility].

## 2024-04-12 ENCOUNTER — OFFICE VISIT (OUTPATIENT)
Dept: DERMATOLOGY | Facility: CLINIC | Age: 54
End: 2024-04-12
Payer: COMMERCIAL

## 2024-04-12 DIAGNOSIS — L81.4 SOLAR LENTIGO: ICD-10-CM

## 2024-04-12 DIAGNOSIS — D22.9 MULTIPLE BENIGN NEVI: Primary | ICD-10-CM

## 2024-04-12 DIAGNOSIS — D18.01 CHERRY ANGIOMA: ICD-10-CM

## 2024-04-12 DIAGNOSIS — D23.9 DYSPLASTIC NEVUS: ICD-10-CM

## 2024-04-12 PROCEDURE — 99213 OFFICE O/P EST LOW 20 MIN: CPT | Performed by: DERMATOLOGY

## 2024-04-12 ASSESSMENT — PAIN SCALES - GENERAL: PAINLEVEL: NO PAIN (0)

## 2024-04-12 NOTE — NURSING NOTE
Dermatology Rooming Note    Gemma Benitezy Guru's goals for this visit include:   Chief Complaint   Patient presents with    Skin Check     Gemma is here today for a FBSE. No new moles or lesions of concern.    Adam Little, CMA

## 2024-04-12 NOTE — PROGRESS NOTES
[facility] Dermatology Note  Encounter Date: Apr 12, 2024  Office Visit     Dermatology Problem List:  FBSE: 4/12/24    # Hx of dysplastic nevus  - R perispinal mid back superior, Junctional dysplastic nevus with mild atypia, Bx proven on 4/7/23  - R perispinal mid back, inferior, Junctional dysplastic nevus with mild atypia,  Bx proven on 4/7/23    Family hx: 2 cousins with MM.   ____________________________________________    Assessment & Plan:     # Multiple benign nevi.   # H/o DN.  # Fhx melanoma.  - Monitor for ABCDEs of melanoma   - Continue sun protection - recommend SPF 30 or higher with frequent application   - Return sooner if noticing changing or symptomatic lesions    # Benign lesions - cherry angiomas, lentigenes.  - No treatment required      Procedures Performed:   None    Follow-up: 1-2 year(s) in-person, or earlier for new or changing lesions    Staff and Scribe:     Scribe Disclosure:   I, KIRTI ESPINOSA, am serving as a scribe; to document services personally performed by Adela Schmidt MD -based on data collection and the provider's statements to me.     Provider Disclosure:   The documentation recorded by the scribe accurately reflects the services I personally performed and the decisions made by me.    Adela Schmidt MD    Department of Dermatology  [facility]: Phone: [phone], Fax: [phone]  4/19/2024       ____________________________________________    CC: Skin Check (Gemma is here today for a FBSE. No new moles or lesions of concern.)    HPI:  Ms. Gemma Garvey is a(n) 53 year old female who presents today as a return patient for FBSE.     Today patient did not report any spots of concern.     Has a hx of dysplastic nevus.     Patient is otherwise feeling well, without additional skin concerns.     Labs Reviewed:  N/A    Physical Exam:  Vitals: LMP 11/26/2011    SKIN: Total skin excluding the undergarment areas was performed. The exam included the head/face, neck, both arms, chest, back, abdomen, both legs, digits and/or nails.   -  There are dome shaped bright red papules on the trunk and extremities .   - Multiple regular brown pigmented macules and papules are identified on the trunk and extremities. .   - Scattered brown macules on sun exposed areas.  - No other lesions of concern on areas examined.     Medications:  Current Outpatient Medications   Medication Sig Dispense Refill    cyanocobalamin (VITAMIN B-12) 100 MCG tablet Take 100 mcg by mouth daily      ferrous sulfate (FEROSUL) 325 (65 Fe) MG tablet Take 325 mg by mouth daily (with breakfast)      fish oil-omega-3 fatty acids 1000 MG capsule Take 2 g by mouth daily      LORazepam (ATIVAN) 0.5 MG tablet Take 1 tablet (0.5 mg) by mouth every 6 hours as needed for anxiety 10 tablet 3    Vitamin D3 (CHOLECALCIFEROL) 25 mcg (1000 units) tablet Take by mouth daily       No current facility-administered medications for this visit.      Past Medical History:   Patient Active Problem List   Diagnosis    Uterovaginal prolapse, incomplete     Past Medical History:   Diagnosis Date    Anemia, unspecified '93    Anemia- now resolved    Depressive disorder, not elsewhere classified '98    Depression (non-psychotic)-resolved with Paxil       CC Referred Self, MD  No address on file on close of this encounter.

## 2024-04-12 NOTE — PATIENT INSTRUCTIONS
Patient Education       Proper skin care from [facility]:    -Eliminate harsh soaps as they strip the natural oils from the skin, often resulting in dry itchy skin ( i.e. Dial, Zest, Persian Spring)  -Use mild soaps such as Cetaphil or Dove Sensitive Skin in the shower. You do not need to use soap on arms, legs, and trunk every time you shower unless visibly soiled.   -Avoid hot or cold showers.  -After showering, lightly dry off and apply moisturizing within 2-3 minutes. This will help trap moisture in the skin.   -Aggressive use of a moisturizer at least 1-2 times a day to the entire body (including -Vanicream, Cetaphil, Aquaphor or Cerave) and moisturize hands after every washing.  -We recommend using moisturizers that come in a tub that needs to be scooped out, not a pump. This has more of an oil base. It will hold moisture in your skin much better than a water base moisturizer. The above recommended are non-pore clogging.      Wear a sunscreen with at least SPF 30 on your face, ears, neck and V of the chest daily. Wear sunscreen on other areas of the body if those areas are exposed to the sun throughout the day. Sunscreens can contain physical and/or chemical blockers. Physical blockers are less likely to clog pores, these include zinc oxide and titanium dioxide. Reapply every two hour and after swimming.     Sunscreen examples: https://www.ewg.org/sunscreen/    UV radiation  UVA radiation remains constant throughout the day and throughout the year. It is a longer wavelength than UVB and therefore penetrates deeper into the skin leading to immediate and delayed tanning, photoaging, and skin cancer. 70-80% of UVA and UVB radiation occurs between the hours of 10am-2pm.  UVB radiation  UVB radiation causes the most harmful effects and is more significant during the summer months. However, snow and ice can reflect UVB radiation leading to skin damage during the winter months as well. UVB radiation is  responsible for tanning, burning, inflammation, delayed erythema (pinkness), pigmentation (brown spots), and skin cancer.     I recommend self monthly full body exams and yearly full body exams with a dermatology provider. If you develop a new or changing lesion please follow up for examination. Most skin cancers are pink and scaly or pink and pearly. However, we do see blue/brown/black skin cancers.  Consider the ABCDEs of melanoma when giving yourself your monthly full body exam ( don't forget the groin, buttocks, feet, toes, etc). A-asymmetry, B-borders, C-color, D-diameter, E-elevation or evolving. If you see any of these changes please follow up in clinic. If you cannot see your back I recommend purchasing a hand held mirror to use with a larger wall mirror.       Checking for Skin Cancer  You can find cancer early by checking your skin each month. There are 3 kinds of skin cancer. They are melanoma, basal cell carcinoma, and squamous cell carcinoma. Doing monthly skin checks is the best way to find new marks or skin changes. Follow the instructions below for checking your skin.   The ABCDEs of checking moles for melanoma   Check your moles or growths for signs of melanoma using ABCDE:   Asymmetry: the sides of the mole or growth don t match  Border: the edges are ragged, notched, or blurred  Color: the color within the mole or growth varies  Diameter: the mole or growth is larger than 6 mm (size of a pencil eraser)  Evolving: the size, shape, or color of the mole or growth is changing (evolving is not shown in the images below)    Checking for other types of skin cancer  Basal cell carcinoma or squamous cell carcinoma have symptoms such as:     A spot or mole that looks different from all other marks on your skin  Changes in how an area feels, such as itching, tenderness, or pain  Changes in the skin's surface, such as oozing, bleeding, or scaliness  A sore that does not heal  New swelling or redness beyond  the border of a mole    Who s at risk?  Anyone can get skin cancer. But you are at greater risk if you have:   Fair skin, light-colored hair, or light-colored eyes  Many moles or abnormal moles on your skin  A history of sunburns from sunlight or tanning beds  A family history of skin cancer  A history of exposure to radiation or chemicals  A weakened immune system  If you have had skin cancer in the past, you are at risk for recurring skin cancer.   How to check your skin  Do your monthly skin checkups in front of a full-length mirror. Check all parts of your body, including your:   Head (ears, face, neck, and scalp)  Torso (front, back, and sides)  Arms (tops, undersides, upper, and lower armpits)  Hands (palms, backs, and fingers, including under the nails)  Buttocks and genitals  Legs (front, back, and sides)  Feet (tops, soles, toes, including under the nails, and between toes)  If you have a lot of moles, take digital photos of them each month. Make sure to take photos both up close and from a distance. These can help you see if any moles change over time.   Most skin changes are not cancer. But if you see any changes in your skin, call your doctor right away. Only he or she can diagnose a problem. If you have skin cancer, seeing your doctor can be the first step toward getting the treatment that could save your life.   [employer] last reviewed this educational content on 4/1/2019 2000-2020 [employer]. [address] [place] [address], [place] [address]. All rights reserved. This information is not intended as a substitute for professional medical care. Always follow your healthcare professional's instructions.

## 2024-04-12 NOTE — LETTER
4/12/2024       RE: Gemma Garvey  [address]  [facility] [address]     Dear Colleague,    Thank you for referring your patient, Gemma Garvey, to the [facility] [place] [facility]. Please see a copy of my visit note below.    [facility] Dermatology Note  Encounter Date: Apr 12, 2024  Office Visit     Dermatology Problem List:  FBSE: 4/12/24    # Hx of dysplastic nevus  - R perispinal mid back superior, Junctional dysplastic nevus with mild atypia, Bx proven on 4/7/23  - R perispinal mid back, inferior, Junctional dysplastic nevus with mild atypia,  Bx proven on 4/7/23    Family hx: 2 cousins with MM.   ____________________________________________    Assessment & Plan:     # Multiple benign nevi.   # H/o DN.  # Fhx melanoma.  - Monitor for ABCDEs of melanoma   - Continue sun protection - recommend SPF 30 or higher with frequent application   - Return sooner if noticing changing or symptomatic lesions    # Benign lesions - cherry angiomas, lentigenes.  - No treatment required      Procedures Performed:   None    Follow-up: 1-2 year(s) in-person, or earlier for new or changing lesions    Staff and Scribe:     Scribe Disclosure:   I, KIRTI ESPINOSA, am serving as a scribe; to document services personally performed by Adela Schmidt MD -based on data collection and the provider's statements to me.     Provider Disclosure:   The documentation recorded by the scribe accurately reflects the services I personally performed and the decisions made by me.    Adela Schmidt MD    Department of Dermatology  [facility]: Phone: [phone], Fax: [phone]  4/19/2024       ____________________________________________    CC: Skin Check (Gemma is here today for a FBSE. No new moles or lesions of concern.)    HPI:  Ms.  Gemma Garvey is a(n) 53 year old female who presents today as a return patient for FBSE.     Today patient did not report any spots of concern.     Has a hx of dysplastic nevus.     Patient is otherwise feeling well, without additional skin concerns.     Labs Reviewed:  N/A    Physical Exam:  Vitals: LMP 11/26/2011   SKIN: Total skin excluding the undergarment areas was performed. The exam included the head/face, neck, both arms, chest, back, abdomen, both legs, digits and/or nails.   -  There are dome shaped bright red papules on the trunk and extremities .   - Multiple regular brown pigmented macules and papules are identified on the trunk and extremities. .   - Scattered brown macules on sun exposed areas.  - No other lesions of concern on areas examined.     Medications:  Current Outpatient Medications   Medication Sig Dispense Refill    cyanocobalamin (VITAMIN B-12) 100 MCG tablet Take 100 mcg by mouth daily      ferrous sulfate (FEROSUL) 325 (65 Fe) MG tablet Take 325 mg by mouth daily (with breakfast)      fish oil-omega-3 fatty acids 1000 MG capsule Take 2 g by mouth daily      LORazepam (ATIVAN) 0.5 MG tablet Take 1 tablet (0.5 mg) by mouth every 6 hours as needed for anxiety 10 tablet 3    Vitamin D3 (CHOLECALCIFEROL) 25 mcg (1000 units) tablet Take by mouth daily       No current facility-administered medications for this visit.      Past Medical History:   Patient Active Problem List   Diagnosis    Uterovaginal prolapse, incomplete     Past Medical History:   Diagnosis Date    Anemia, unspecified '93    Anemia- now resolved    Depressive disorder, not elsewhere classified '98    Depression (non-psychotic)-resolved with Paxil       CC Referred Self, MD  No address on file on close of this encounter.

## 2024-09-11 ENCOUNTER — MYC REFILL (OUTPATIENT)
Dept: INTERNAL MEDICINE | Facility: CLINIC | Age: 54
End: 2024-09-11
Payer: COMMERCIAL

## 2024-09-11 DIAGNOSIS — F41.9 ANXIETY: ICD-10-CM

## 2024-09-12 RX ORDER — LORAZEPAM 0.5 MG/1
0.5 TABLET ORAL EVERY 6 HOURS PRN
Qty: 10 TABLET | Refills: 3 | OUTPATIENT
Start: 2024-09-12

## 2024-09-12 NOTE — TELEPHONE ENCOUNTER
Per chart review, the patient had an appointment on 11/16/2023 with Dr. Yeimi Hernandez to establish care and was prescribed LORazepam (ATIVAN) 0.5 mg tab   Indications: Panic.

## 2024-11-07 ASSESSMENT — ANXIETY QUESTIONNAIRES: GAD7 TOTAL SCORE: 2

## (undated) DEVICE — KIT ENDO TURNOVER/PROCEDURE CARRY-ON 101822

## (undated) DEVICE — SNARE CAPIVATOR ROUND COLD SNR BX10 M00561101

## (undated) DEVICE — SUCTION MANIFOLD NEPTUNE 2 SYS 1 PORT 702-025-000

## (undated) DEVICE — TUBING SUCTION MEDI-VAC 1/4"X20' N620A

## (undated) DEVICE — GOWN IMPERVIOUS 2XL BLUE

## (undated) DEVICE — SOL WATER IRRIG 500ML BOTTLE 2F7113

## (undated) DEVICE — SPECIMEN CONTAINER 3OZ W/FORMALIN 59901

## (undated) RX ORDER — FENTANYL CITRATE 50 UG/ML
INJECTION, SOLUTION INTRAMUSCULAR; INTRAVENOUS
Status: DISPENSED
Start: 2023-10-23